# Patient Record
Sex: FEMALE | Race: WHITE | Employment: OTHER | URBAN - METROPOLITAN AREA
[De-identification: names, ages, dates, MRNs, and addresses within clinical notes are randomized per-mention and may not be internally consistent; named-entity substitution may affect disease eponyms.]

---

## 2018-04-11 ENCOUNTER — HOSPITAL ENCOUNTER (OUTPATIENT)
Dept: CARDIAC CATH/INVASIVE PROCEDURES | Age: 63
Discharge: HOME | End: 2018-04-11
Payer: MEDICARE

## 2018-04-11 ENCOUNTER — ANESTHESIA (OUTPATIENT)
Dept: CARDIAC CATH/INVASIVE PROCEDURES | Age: 63
End: 2018-04-11

## 2018-04-11 ENCOUNTER — ANESTHESIA EVENT (OUTPATIENT)
Dept: CARDIAC CATH/INVASIVE PROCEDURES | Age: 63
End: 2018-04-11

## 2018-04-11 ENCOUNTER — APPOINTMENT (OUTPATIENT)
Dept: CARDIAC CATH/INVASIVE PROCEDURES | Age: 63
End: 2018-04-11
Payer: MEDICARE

## 2018-04-11 VITALS
BODY MASS INDEX: 38.93 KG/M2 | DIASTOLIC BLOOD PRESSURE: 84 MMHG | SYSTOLIC BLOOD PRESSURE: 167 MMHG | HEART RATE: 120 BPM | HEIGHT: 64 IN | OXYGEN SATURATION: 100 % | TEMPERATURE: 98 F | WEIGHT: 228 LBS

## 2018-04-11 VITALS — OXYGEN SATURATION: 99 % | DIASTOLIC BLOOD PRESSURE: 72 MMHG | SYSTOLIC BLOOD PRESSURE: 165 MMHG

## 2018-04-11 DIAGNOSIS — I48.92 ATRIAL FLUTTER, UNSPECIFIED TYPE (HCC): ICD-10-CM

## 2018-04-11 LAB
ANION GAP SERPL CALCULATED.3IONS-SCNC: 14 MMOL/L (ref 7–16)
BASOPHILS ABSOLUTE: 0.02 E9/L (ref 0–0.2)
BASOPHILS RELATIVE PERCENT: 0.5 % (ref 0–2)
BUN BLDV-MCNC: 46 MG/DL (ref 8–23)
CALCIUM SERPL-MCNC: 10.1 MG/DL (ref 8.6–10.2)
CHLORIDE BLD-SCNC: 100 MMOL/L (ref 98–107)
CO2: 29 MMOL/L (ref 22–29)
CREAT SERPL-MCNC: 3.1 MG/DL (ref 0.5–1)
EKG ATRIAL RATE: 68 BPM
EKG P AXIS: 60 DEGREES
EKG P-R INTERVAL: 384 MS
EKG Q-T INTERVAL: 486 MS
EKG QRS DURATION: 160 MS
EKG QTC CALCULATION (BAZETT): 516 MS
EKG R AXIS: 24 DEGREES
EKG T AXIS: 14 DEGREES
EKG VENTRICULAR RATE: 68 BPM
EOSINOPHILS ABSOLUTE: 0.07 E9/L (ref 0.05–0.5)
EOSINOPHILS RELATIVE PERCENT: 1.8 % (ref 0–6)
GFR AFRICAN AMERICAN: 18
GFR NON-AFRICAN AMERICAN: 15 ML/MIN/1.73
GLUCOSE BLD-MCNC: 85 MG/DL (ref 74–109)
HCT VFR BLD CALC: 32.8 % (ref 34–48)
HEMOGLOBIN: 10.5 G/DL (ref 11.5–15.5)
IMMATURE GRANULOCYTES #: 0.01 E9/L
IMMATURE GRANULOCYTES %: 0.3 % (ref 0–5)
LYMPHOCYTES ABSOLUTE: 1.09 E9/L (ref 1.5–4)
LYMPHOCYTES RELATIVE PERCENT: 27.9 % (ref 20–42)
MAGNESIUM: 2 MG/DL (ref 1.6–2.6)
MCH RBC QN AUTO: 29.2 PG (ref 26–35)
MCHC RBC AUTO-ENTMCNC: 32 % (ref 32–34.5)
MCV RBC AUTO: 91.1 FL (ref 80–99.9)
MONOCYTES ABSOLUTE: 0.29 E9/L (ref 0.1–0.95)
MONOCYTES RELATIVE PERCENT: 7.4 % (ref 2–12)
NEUTROPHILS ABSOLUTE: 2.42 E9/L (ref 1.8–7.3)
NEUTROPHILS RELATIVE PERCENT: 62.1 % (ref 43–80)
PDW BLD-RTO: 13.7 FL (ref 11.5–15)
PLATELET # BLD: 125 E9/L (ref 130–450)
PMV BLD AUTO: 12 FL (ref 7–12)
POTASSIUM SERPL-SCNC: 3.5 MMOL/L (ref 3.5–5)
RBC # BLD: 3.6 E12/L (ref 3.5–5.5)
SODIUM BLD-SCNC: 143 MMOL/L (ref 132–146)
WBC # BLD: 3.9 E9/L (ref 4.5–11.5)

## 2018-04-11 PROCEDURE — 93005 ELECTROCARDIOGRAM TRACING: CPT | Performed by: INTERNAL MEDICINE

## 2018-04-11 PROCEDURE — 36415 COLL VENOUS BLD VENIPUNCTURE: CPT

## 2018-04-11 PROCEDURE — 83735 ASSAY OF MAGNESIUM: CPT

## 2018-04-11 PROCEDURE — 2580000003 HC RX 258: Performed by: INTERNAL MEDICINE

## 2018-04-11 PROCEDURE — 93010 ELECTROCARDIOGRAM REPORT: CPT | Performed by: INTERNAL MEDICINE

## 2018-04-11 PROCEDURE — 80048 BASIC METABOLIC PNL TOTAL CA: CPT

## 2018-04-11 PROCEDURE — 6360000002 HC RX W HCPCS: Performed by: NURSE ANESTHETIST, CERTIFIED REGISTERED

## 2018-04-11 PROCEDURE — 85025 COMPLETE CBC W/AUTO DIFF WBC: CPT

## 2018-04-11 PROCEDURE — 3700000001 HC ADD 15 MINUTES (ANESTHESIA)

## 2018-04-11 PROCEDURE — 3700000000 HC ANESTHESIA ATTENDED CARE

## 2018-04-11 RX ORDER — SODIUM CHLORIDE 9 MG/ML
20 INJECTION, SOLUTION INTRAVENOUS ONCE
Status: COMPLETED | OUTPATIENT
Start: 2018-04-11 | End: 2018-04-11

## 2018-04-11 RX ORDER — PROPOFOL 10 MG/ML
INJECTION, EMULSION INTRAVENOUS PRN
Status: DISCONTINUED | OUTPATIENT
Start: 2018-04-11 | End: 2018-04-11 | Stop reason: SDUPTHER

## 2018-04-11 RX ADMIN — PROPOFOL 30 MG: 10 INJECTION, EMULSION INTRAVENOUS at 15:21

## 2018-04-11 RX ADMIN — SODIUM CHLORIDE: 9 INJECTION, SOLUTION INTRAVENOUS at 15:00

## 2018-04-11 RX ADMIN — PROPOFOL 50 MG: 10 INJECTION, EMULSION INTRAVENOUS at 15:20

## 2018-10-18 ENCOUNTER — HOSPITAL ENCOUNTER (OUTPATIENT)
Dept: OTHER | Age: 63
Discharge: HOME | End: 2018-10-18
Payer: MEDICARE

## 2018-10-18 ENCOUNTER — HOSPITAL ENCOUNTER (OUTPATIENT)
Age: 63
Discharge: HOME | End: 2018-10-18
Payer: MEDICARE

## 2018-10-18 LAB
ALBUMIN SERPL-MCNC: 4.2 G/DL (ref 3.5–5.2)
ALP BLD-CCNC: 97 U/L (ref 35–104)
ALT SERPL-CCNC: 16 U/L (ref 0–32)
ANION GAP SERPL CALCULATED.3IONS-SCNC: 12 MMOL/L (ref 7–16)
AST SERPL-CCNC: 19 U/L (ref 0–31)
BACTERIA: ABNORMAL /HPF
BASOPHILS ABSOLUTE: 0.05 E9/L (ref 0–0.2)
BASOPHILS RELATIVE PERCENT: 0.9 % (ref 0–2)
BILIRUB SERPL-MCNC: 0.8 MG/DL (ref 0–1.2)
BILIRUBIN URINE: NEGATIVE
BLOOD, URINE: ABNORMAL
BUN BLDV-MCNC: 37 MG/DL (ref 8–23)
C-REACTIVE PROTEIN, HIGH SENSITIVITY: 10.7 MG/L (ref 0–3)
CALCIUM IONIZED: 1.48 MMOL/L (ref 1.15–1.33)
CALCIUM SERPL-MCNC: 11 MG/DL (ref 8.6–10.2)
CHLORIDE BLD-SCNC: 101 MMOL/L (ref 98–107)
CHOLESTEROL, TOTAL: 149 MG/DL (ref 0–199)
CLARITY: ABNORMAL
CO2: 27 MMOL/L (ref 22–29)
COLOR: YELLOW
CREAT SERPL-MCNC: 3.3 MG/DL (ref 0.5–1)
CREATININE URINE: 99 MG/DL (ref 29–226)
EOSINOPHILS ABSOLUTE: 0.11 E9/L (ref 0.05–0.5)
EOSINOPHILS RELATIVE PERCENT: 2 % (ref 0–6)
FERRITIN: 75 NG/ML
GFR AFRICAN AMERICAN: 17
GFR NON-AFRICAN AMERICAN: 14 ML/MIN/1.73
GLUCOSE BLD-MCNC: 95 MG/DL (ref 74–109)
GLUCOSE URINE: NEGATIVE MG/DL
HBA1C MFR BLD: 5.3 % (ref 4–5.6)
HCT VFR BLD CALC: 34.8 % (ref 34–48)
HDLC SERPL-MCNC: 44 MG/DL
HEMOGLOBIN: 10.6 G/DL (ref 11.5–15.5)
IMMATURE GRANULOCYTES #: 0.02 E9/L
IMMATURE GRANULOCYTES %: 0.4 % (ref 0–5)
IRON SATURATION: 23 % (ref 15–50)
IRON: 59 MCG/DL (ref 37–145)
KETONES, URINE: NEGATIVE MG/DL
LDL CHOLESTEROL CALCULATED: 80 MG/DL (ref 0–99)
LEUKOCYTE ESTERASE, URINE: ABNORMAL
LYMPHOCYTES ABSOLUTE: 1.22 E9/L (ref 1.5–4)
LYMPHOCYTES RELATIVE PERCENT: 22.2 % (ref 20–42)
MAGNESIUM: 2.3 MG/DL (ref 1.6–2.6)
MCH RBC QN AUTO: 27.5 PG (ref 26–35)
MCHC RBC AUTO-ENTMCNC: 30.5 % (ref 32–34.5)
MCV RBC AUTO: 90.4 FL (ref 80–99.9)
MICROALBUMIN UR-MCNC: 390.1 MG/L
MICROALBUMIN/CREAT UR-RTO: 394 (ref 0–30)
MONOCYTES ABSOLUTE: 0.38 E9/L (ref 0.1–0.95)
MONOCYTES RELATIVE PERCENT: 6.9 % (ref 2–12)
NEUTROPHILS ABSOLUTE: 3.72 E9/L (ref 1.8–7.3)
NEUTROPHILS RELATIVE PERCENT: 67.6 % (ref 43–80)
NITRITE, URINE: NEGATIVE
PARATHYROID HORMONE INTACT: 24 PG/ML (ref 15–65)
PDW BLD-RTO: 14 FL (ref 11.5–15)
PH UA: 7 (ref 5–9)
PHOSPHORUS: 4.3 MG/DL (ref 2.5–4.5)
PLATELET # BLD: 168 E9/L (ref 130–450)
PMV BLD AUTO: 11.7 FL (ref 7–12)
POTASSIUM SERPL-SCNC: 4.3 MMOL/L (ref 3.5–5)
PROTEIN UA: 100 MG/DL
RBC # BLD: 3.85 E12/L (ref 3.5–5.5)
RBC UA: ABNORMAL /HPF (ref 0–2)
SODIUM BLD-SCNC: 140 MMOL/L (ref 132–146)
SPECIFIC GRAVITY UA: 1.01 (ref 1–1.03)
TOTAL IRON BINDING CAPACITY: 261 MCG/DL (ref 250–450)
TOTAL PROTEIN: 7.6 G/DL (ref 6.4–8.3)
TRIGL SERPL-MCNC: 126 MG/DL (ref 0–149)
URIC ACID, SERUM: 9.2 MG/DL (ref 2.4–5.7)
UROBILINOGEN, URINE: 0.2 E.U./DL
VITAMIN D 25-HYDROXY: 57 NG/ML (ref 30–100)
VLDLC SERPL CALC-MCNC: 25 MG/DL
WBC # BLD: 5.5 E9/L (ref 4.5–11.5)
WBC UA: >20 /HPF (ref 0–5)

## 2018-10-18 PROCEDURE — 83550 IRON BINDING TEST: CPT

## 2018-10-18 PROCEDURE — 82570 ASSAY OF URINE CREATININE: CPT

## 2018-10-18 PROCEDURE — 83036 HEMOGLOBIN GLYCOSYLATED A1C: CPT

## 2018-10-18 PROCEDURE — 84550 ASSAY OF BLOOD/URIC ACID: CPT

## 2018-10-18 PROCEDURE — 82044 UR ALBUMIN SEMIQUANTITATIVE: CPT

## 2018-10-18 PROCEDURE — 82330 ASSAY OF CALCIUM: CPT

## 2018-10-18 PROCEDURE — 36415 COLL VENOUS BLD VENIPUNCTURE: CPT

## 2018-10-18 PROCEDURE — 83540 ASSAY OF IRON: CPT

## 2018-10-18 PROCEDURE — 84100 ASSAY OF PHOSPHORUS: CPT

## 2018-10-18 PROCEDURE — 82728 ASSAY OF FERRITIN: CPT

## 2018-10-18 PROCEDURE — 86141 C-REACTIVE PROTEIN HS: CPT

## 2018-10-18 PROCEDURE — 99211 OFF/OP EST MAY X REQ PHY/QHP: CPT

## 2018-10-18 PROCEDURE — 83735 ASSAY OF MAGNESIUM: CPT

## 2018-10-18 PROCEDURE — 83970 ASSAY OF PARATHORMONE: CPT

## 2018-10-18 PROCEDURE — 80061 LIPID PANEL: CPT

## 2018-10-18 PROCEDURE — 82306 VITAMIN D 25 HYDROXY: CPT

## 2018-10-18 PROCEDURE — 81001 URINALYSIS AUTO W/SCOPE: CPT

## 2018-10-18 PROCEDURE — 85025 COMPLETE CBC W/AUTO DIFF WBC: CPT

## 2018-10-18 PROCEDURE — 80053 COMPREHEN METABOLIC PANEL: CPT

## 2018-10-23 ENCOUNTER — HOSPITAL ENCOUNTER (OUTPATIENT)
Age: 63
Discharge: HOME | End: 2018-10-23
Payer: COMMERCIAL

## 2018-10-23 LAB
ANION GAP SERPL CALCULATED.3IONS-SCNC: 14 MMOL/L (ref 7–16)
BUN BLDV-MCNC: 55 MG/DL (ref 8–23)
CALCIUM IONIZED: 1.43 MMOL/L (ref 1.15–1.33)
CALCIUM SERPL-MCNC: 10.7 MG/DL (ref 8.6–10.2)
CHLORIDE BLD-SCNC: 103 MMOL/L (ref 98–107)
CO2: 26 MMOL/L (ref 22–29)
CREAT SERPL-MCNC: 3.2 MG/DL (ref 0.5–1)
GFR AFRICAN AMERICAN: 18
GFR NON-AFRICAN AMERICAN: 15 ML/MIN/1.73
GLUCOSE BLD-MCNC: 103 MG/DL (ref 74–109)
POTASSIUM SERPL-SCNC: 4.4 MMOL/L (ref 3.5–5)
SODIUM BLD-SCNC: 143 MMOL/L (ref 132–146)

## 2018-10-23 PROCEDURE — 36415 COLL VENOUS BLD VENIPUNCTURE: CPT

## 2018-10-23 PROCEDURE — 82330 ASSAY OF CALCIUM: CPT

## 2018-10-23 PROCEDURE — 80048 BASIC METABOLIC PNL TOTAL CA: CPT

## 2018-10-23 PROCEDURE — 87088 URINE BACTERIA CULTURE: CPT

## 2018-10-24 LAB — URINE CULTURE, ROUTINE: NORMAL

## 2018-11-06 ENCOUNTER — HOSPITAL ENCOUNTER (OUTPATIENT)
Age: 63
Discharge: HOME | End: 2018-11-06
Payer: COMMERCIAL

## 2018-11-06 LAB
ANION GAP SERPL CALCULATED.3IONS-SCNC: 12 MMOL/L (ref 7–16)
BUN BLDV-MCNC: 47 MG/DL (ref 8–23)
CALCIUM IONIZED: 1.29 MMOL/L (ref 1.15–1.33)
CALCIUM SERPL-MCNC: 9.7 MG/DL (ref 8.6–10.2)
CHLORIDE BLD-SCNC: 104 MMOL/L (ref 98–107)
CO2: 28 MMOL/L (ref 22–29)
CREAT SERPL-MCNC: 3.4 MG/DL (ref 0.5–1)
GFR AFRICAN AMERICAN: 17
GFR NON-AFRICAN AMERICAN: 14 ML/MIN/1.73
GLUCOSE BLD-MCNC: 75 MG/DL (ref 74–99)
PARATHYROID HORMONE INTACT: 86 PG/ML (ref 15–65)
POTASSIUM SERPL-SCNC: 5.2 MMOL/L (ref 3.5–5)
SODIUM BLD-SCNC: 144 MMOL/L (ref 132–146)
VITAMIN D 25-HYDROXY: 57 NG/ML (ref 30–100)

## 2018-11-06 PROCEDURE — 82306 VITAMIN D 25 HYDROXY: CPT

## 2018-11-06 PROCEDURE — 83970 ASSAY OF PARATHORMONE: CPT

## 2018-11-06 PROCEDURE — 36415 COLL VENOUS BLD VENIPUNCTURE: CPT

## 2018-11-06 PROCEDURE — 80048 BASIC METABOLIC PNL TOTAL CA: CPT

## 2018-11-06 PROCEDURE — 82330 ASSAY OF CALCIUM: CPT

## 2019-02-22 ENCOUNTER — HOSPITAL ENCOUNTER (OUTPATIENT)
Age: 64
Discharge: HOME | End: 2019-02-22
Payer: COMMERCIAL

## 2019-02-22 PROCEDURE — 87186 SC STD MICRODIL/AGAR DIL: CPT

## 2019-02-22 PROCEDURE — 87088 URINE BACTERIA CULTURE: CPT

## 2019-02-22 PROCEDURE — 87077 CULTURE AEROBIC IDENTIFY: CPT

## 2019-02-24 LAB
ORGANISM: ABNORMAL
URINE CULTURE, ROUTINE: ABNORMAL
URINE CULTURE, ROUTINE: ABNORMAL

## 2019-05-01 ENCOUNTER — HOSPITAL ENCOUNTER (OUTPATIENT)
Age: 64
Discharge: HOME | End: 2019-05-01
Payer: COMMERCIAL

## 2019-05-01 LAB
ANION GAP SERPL CALCULATED.3IONS-SCNC: 11 MMOL/L (ref 7–16)
BUN BLDV-MCNC: 42 MG/DL (ref 8–23)
CALCIUM SERPL-MCNC: 9.3 MG/DL (ref 8.6–10.2)
CHLORIDE BLD-SCNC: 109 MMOL/L (ref 98–107)
CO2: 25 MMOL/L (ref 22–29)
CREAT SERPL-MCNC: 2 MG/DL (ref 0.5–1)
GFR AFRICAN AMERICAN: 30
GFR NON-AFRICAN AMERICAN: 25 ML/MIN/1.73
GLUCOSE BLD-MCNC: 71 MG/DL (ref 74–99)
HCT VFR BLD CALC: 38.8 % (ref 34–48)
HEMOGLOBIN: 12.2 G/DL (ref 11.5–15.5)
MAGNESIUM: 2.1 MG/DL (ref 1.6–2.6)
MCH RBC QN AUTO: 28 PG (ref 26–35)
MCHC RBC AUTO-ENTMCNC: 31.4 % (ref 32–34.5)
MCV RBC AUTO: 89.2 FL (ref 80–99.9)
PDW BLD-RTO: 15.5 FL (ref 11.5–15)
PLATELET # BLD: 135 E9/L (ref 130–450)
PMV BLD AUTO: 11.9 FL (ref 7–12)
POTASSIUM SERPL-SCNC: 4.9 MMOL/L (ref 3.5–5)
RBC # BLD: 4.35 E12/L (ref 3.5–5.5)
SODIUM BLD-SCNC: 145 MMOL/L (ref 132–146)
WBC # BLD: 4.6 E9/L (ref 4.5–11.5)

## 2019-05-01 PROCEDURE — 83735 ASSAY OF MAGNESIUM: CPT

## 2019-05-01 PROCEDURE — 80048 BASIC METABOLIC PNL TOTAL CA: CPT

## 2019-05-01 PROCEDURE — 36415 COLL VENOUS BLD VENIPUNCTURE: CPT

## 2019-05-01 PROCEDURE — 85027 COMPLETE CBC AUTOMATED: CPT

## 2019-05-22 ENCOUNTER — OFFICE VISIT (OUTPATIENT)
Dept: VASCULAR SURGERY | Age: 64
End: 2019-05-22
Payer: MEDICARE

## 2019-05-22 VITALS — HEART RATE: 64 BPM | DIASTOLIC BLOOD PRESSURE: 74 MMHG | SYSTOLIC BLOOD PRESSURE: 118 MMHG

## 2019-05-22 DIAGNOSIS — N18.4 CKD (CHRONIC KIDNEY DISEASE) STAGE 4, GFR 15-29 ML/MIN (HCC): Primary | ICD-10-CM

## 2019-05-22 PROCEDURE — 99203 OFFICE O/P NEW LOW 30 MIN: CPT | Performed by: NURSE PRACTITIONER

## 2019-05-22 NOTE — PROGRESS NOTES
Vascular Surgery Outpatient Consultation      Chief Complaint   Patient presents with    Surgical Consult     Had vein mapping, needs AVF, not on dialysis yet, patient is right handed. Reason for Consult:  Hemodialysis access. Requesting Physician:  Dr. Jay Ortiz:                The patient is a 61 y.o. female who is referred for evaluation of hemodialysis access. The patient reports an approximate renal function of 24%. The patient states being right-hand dominant. She denies any history of pacemaker or long term catheter on the left side. Past Medical History:        Diagnosis Date    A-fib [facility]) 2014    pt states diagnosed incidently during tx for uri, Dr Esvin Shahid PCP treating, pt to see Dr Deyanira Hankins first vs 2014.  Anemia     resolved states pt    Anemia associated with chronic renal failure     Arthritis     Cataract     CHF (congestive heart failure) (HCC)     Chronic kidney disease, stage IV (severe) (HCC)     Diabetes mellitus ([facility] [address]     PRETTY GOOD PER PT    Hyperlipidemia     Hypertension     FINE PER PT    IBS (irritable bowel syndrome)     Obesity     Osteoarthritis     knees, right hand and left shoulder    Right cataract     Tendonitis of foot     left    Vitamin D deficiency     resolved vit supplement per pt     Past Surgical History:        Procedure Laterality Date    APPENDECTOMY      same time as cholecystectomy    CATARACT REMOVAL WITH IMPLANT Left 2014    CATARACT REMOVAL WITH IMPLANT  14     SECTION  1987    [facility]   [id number] [facility]    COLONOSCOPY  2012    diarrhea, irritable bowel syndrome, anal polyp biopsied, Dr. Eric [facility], [facility]    ENDOSCOPY, COLON, DIAGNOSTIC      colonoscopy    EYE SURGERY      cataract bilateral    [address]    same time as C section, ?  left ovary    TUBAL LIGATION      same time as C section     Current Medications:   Prior to Admission medications    Medication Sig Start Date End Date Taking? Authorizing Provider   apixaban (ELIQUIS) 5 MG TABS tablet Take 1 tablet by mouth 2 times daily Pt States ok to continue preop per Dr Randell Quintanilla. 4/11/18  Yes Mariposa Mcgregor MD   insulin glargine (LANTUS) 100 UNIT/ML injection vial Inject 40 Units into the skin nightly    Yes Historical Provider, MD   magnesium oxide (MAG-OX) 400 MG tablet Take 400 mg by mouth daily   Yes Historical Provider, MD   hydrALAZINE (APRESOLINE) 50 MG tablet Take 50 mg by mouth 3 times daily    Yes Historical Provider, MD   furosemide (LASIX) 40 MG tablet Take 40 mg by mouth daily    Yes Historical Provider, MD   isosorbide mononitrate (IMDUR) 60 MG CR tablet Take 120 mg by mouth daily   Yes Historical Provider, MD   sodium bicarbonate 650 MG tablet Take 1,300 mg by mouth 2 times daily   Yes Historical Provider, MD   vitamin B-12 (CYANOCOBALAMIN) 1000 MCG tablet Take 500 mcg by mouth 2 times daily. Yes Historical Provider, MD   loratadine (CLARITIN) 10 MG tablet TAKE ONE TABLET BY MOUTH EVERY DAY AS NEEDED  Patient taking differently: Take 10 mg by mouth daily  7/31/13  Yes Herbie Thompson MD   pravastatin (PRAVACHOL) 40 MG tablet Take 1 tablet by mouth daily. Patient taking differently: Take 80 mg by mouth daily  4/3/13  Yes Ranjana Beltran MD   INSULIN SYRINGE .5CC/29G (ACCUSURE INS SYR .5CC/29GX1/2\") 29G X 1/2\" 0.5 ML MISC by Does not apply route. 12/6/12  Yes Rachel Ha MD   Coenzyme Q-10 100 MG CAPS Take 1 capsule by mouth daily. 12/6/12  Yes Rachel Ha MD   Glucose Blood (BLOOD GLUCOSE TEST STRIPS) STRP 1 each by In Vitro route daily. OK to switch per insurance coverage. 12/6/12  Yes Rachel Ha MD   Lancets MISC 1 each by Does not apply route. OK to change per insurance coverage.  12/6/12  Yes Rachel Ha MD   Vitamin D (CHOLECALCIFEROL) 1000 UNITS CAPS capsule Take 1,000 Units by mouth 2 times daily Historical Provider, MD   calcitRIOL (ROCALTROL) 0.25 MCG capsule Take 0.5 mcg by mouth daily Monday Wednesday AND FRIDAY    Historical Provider, MD     Allergies:  Latex; Adhesive tape; Aldactone [spironolactone]; Pcn [penicillins];  Terazosin hcl; and Naproxen    Social History     Socioeconomic History    Marital status:      Spouse name: Not on file    Number of children: Not on file    Years of education: Not on file    Highest education level: Not on file   Occupational History    Not on file   Social Needs    Financial resource strain: Not on file    Food insecurity:     Worry: Not on file     Inability: Not on file    Transportation needs:     Medical: Not on file     Non-medical: Not on file   Tobacco Use    Smoking status: Never Smoker    Smokeless tobacco: Never Used   Substance and Sexual Activity    Alcohol use: No    Drug use: No    Sexual activity: Not on file   Lifestyle    Physical activity:     Days per week: Not on file     Minutes per session: Not on file    Stress: Not on file   Relationships    Social connections:     Talks on phone: Not on file     Gets together: Not on file     Attends Gnosticist service: Not on file     Active member of club or organization: Not on file     Attends meetings of clubs or organizations: Not on file     Relationship status: Not on file    Intimate partner violence:     Fear of current or ex partner: Not on file     Emotionally abused: Not on file     Physically abused: Not on file     Forced sexual activity: Not on file   Other Topics Concern    Not on file   Social History Narrative    Not on file        Family History   Problem Relation Age of Onset    Heart Failure Mother     Cancer Mother         lung    Thyroid Disease Mother     Heart Failure Father     Diabetes Paternal Aunt     Diabetes Paternal Uncle        REVIEW OF SYSTEMS (New symptoms):    Eyes:      Blurred vision:  No [x]/Yes []               Diplopia:   No [x]/Yes []               Vision loss:       No [x]/Yes []   Ears, nose, throat:             Hearing loss:    No [x]/Yes []      Vertigo:   No [x]/Yes []                       Swallowing problem:  No [x]/Yes []               Nose bleeds:   No [x]/Yes []      Voice hoarseness:  No [x]/Yes []  Respiratory:             Cough:   No [x]/Yes []      Pleuritic chest pain:  No [x]/Yes []                        Dyspnea:   No [x]/Yes []      Wheezing:   No [x]/Yes []  Cardiovascular:             Angina:   No [x]/Yes []      Palpitations:   No [x]/Yes []          Claudication:    No [x]/Yes []      Leg swelling:   No [x]/Yes []  Gastrointestinal:             Nausea or vomiting:  No [x]/Yes []               Abdominal pain:  No [x]/Yes []                     Intestinal bleeding: No [x]/Yes []  Musculoskeletal:             Leg pain:   No [x]/Yes []      Back pain:   No [x]/Yes []                    Weakness:   No [x]/Yes []  Neurologic:             Numbness:   No [x]/Yes []      Paralysis:   No [x]/Yes []                       Headaches:   No [x]/Yes []  Hematologic, lymphatic:   Anemia:   No [x]/Yes []              Bleeding or bruising:  No [x]/Yes []              Fevers or chills: No [x]/Yes []  Endocrine:             Temp intolerance:   No [x]/Yes []                       Polydipsia, polyuria:  No [x]/Yes []  Skin:              Rash:    No [x]/Yes []      Ulcers:   No [x]/Yes []              Abnorm pigment: No [x]/Yes []  :              Frequency/urgency:  No [x]/Yes []      Hematuria:    No [x]/Yes []                      Incontinence:    No [x]/Yes []    PHYSICAL EXAM:  Vitals:    05/22/19 1130   BP: 118/74   Pulse: 64     General Appearance: alert and oriented to person, place and time, well developed and well- nourished, in no acute distress  Skin: warm and dry, no rash or erythema  Head: normocephalic and atraumatic  Eyes: extraocular eye movements intact, conjunctivae normal  ENT: tympanic membrane, external ear and ear canal normal bilaterally, nose without deformity  Pulmonary/Chest: clear to auscultation bilaterally- no wheezes, rales or rhonchi, normal air movement, no respiratory distress  Cardiovascular: normal rate, irregular rhythm, no carotid bruits  Abdomen: soft, non-tender, non-distended, normal bowel sounds, no masses or organomegaly  Musculoskeletal: normal range of motion, no joint swelling, deformity or tenderness  Neurologic: no cranial nerve deficit, gait, coordination and speech normal  Extremities: no leg edema bilaterally    PULSE EXAM      Right      Left   Brachial  2   Radial  2   Ulnar  1   (3=normal, 2=diminished, 1=barely palpable, 4=widened)    Problem List Items Addressed This Visit     None      Visit Diagnoses     CKD (chronic kidney disease) stage 4, GFR 15-29 ml/min ([facility])    -  Primary        I reviewed the vein mapping with the patient and feel that the best option is creation left brachiocephalic AVF. The procedure including risks, benefits and alternative options were discussed. The patient understands and wishes to proceed. Pt seen and plan reviewed with Dr. Shira Freedman. Tristin Ochoa CNP    No follow-ups on file.

## 2019-05-31 ENCOUNTER — HOSPITAL ENCOUNTER (OUTPATIENT)
Age: 64
Discharge: HOME | End: 2019-05-31
Payer: MEDICARE

## 2019-05-31 LAB
ANION GAP SERPL CALCULATED.3IONS-SCNC: 14 MMOL/L (ref 7–16)
BUN BLDV-MCNC: 59 MG/DL (ref 8–23)
CALCIUM SERPL-MCNC: 9.9 MG/DL (ref 8.6–10.2)
CHLORIDE BLD-SCNC: 104 MMOL/L (ref 98–107)
CO2: 26 MMOL/L (ref 22–29)
CREAT SERPL-MCNC: 2.1 MG/DL (ref 0.5–1)
GFR AFRICAN AMERICAN: 29
GFR NON-AFRICAN AMERICAN: 24 ML/MIN/1.73
GLUCOSE BLD-MCNC: 70 MG/DL (ref 74–99)
HCT VFR BLD CALC: 41.8 % (ref 34–48)
HEMOGLOBIN: 13.2 G/DL (ref 11.5–15.5)
MAGNESIUM: 2.3 MG/DL (ref 1.6–2.6)
MCH RBC QN AUTO: 28.3 PG (ref 26–35)
MCHC RBC AUTO-ENTMCNC: 31.6 % (ref 32–34.5)
MCV RBC AUTO: 89.5 FL (ref 80–99.9)
PDW BLD-RTO: 15.1 FL (ref 11.5–15)
PLATELET # BLD: 157 E9/L (ref 130–450)
PMV BLD AUTO: 11.5 FL (ref 7–12)
POTASSIUM SERPL-SCNC: 4.7 MMOL/L (ref 3.5–5)
RBC # BLD: 4.67 E12/L (ref 3.5–5.5)
SODIUM BLD-SCNC: 144 MMOL/L (ref 132–146)
WBC # BLD: 4.8 E9/L (ref 4.5–11.5)

## 2019-05-31 PROCEDURE — 36415 COLL VENOUS BLD VENIPUNCTURE: CPT

## 2019-05-31 PROCEDURE — 83735 ASSAY OF MAGNESIUM: CPT

## 2019-05-31 PROCEDURE — 80048 BASIC METABOLIC PNL TOTAL CA: CPT

## 2019-05-31 PROCEDURE — 85027 COMPLETE CBC AUTOMATED: CPT

## 2019-06-07 NOTE — PROGRESS NOTES
[facility] PRE-ADMISSION TESTING GENERAL INSTRUCTIONS- [facility]-phone number:[phone]    GENERAL INSTRUCTIONS  [x] Antibacterial Soap shower Night before and/or AM of Surgery  [] Anjum wipe instruction sheet and wipes given. [x] Nothing by mouth after midnight, including gum, candy, mints, or water. [x] You may brush your teeth, gargle, but do NOT swallow water. []Hibiclens shower  the night before and the morning of surgery. Do not use             Hibiclens on your face or head. [x]No smoking, chewing tobacco, illegal drugs, or alcohol within 24 hours of your surgery. [x] Jewelry, valuables or body piercing's should not be brought to the hospital. All body and/or tongue piercing's must be removed prior to arriving to hospital.  ALL hair pins must be removed. [x] Do not wear makeup, lotions, powders, deodorant. Nail polish as directed by the nurse. [x] Arrange transportation with a responsible adult  to and from the hospital. If you do not have a responsible adult  to transport you, you will need to make arrangements with a medical transportation company (i.e. [employer]. A Uber/taxi/bus is not appropriate unless you are accompanied by a responsible adult ). Arrange for someone to be with you for the remainder of the day and for 24 hours after your procedure due to having had anesthesia. Who will be your  for transportation?_______FAMILY MEMBER___________   Who will be staying with you for 24 hrs after your procedure?_____MEMBER_____________  [x] Bring insurance card and photo ID.  [] Transfusion Bracelet: Please bring with you to hospital, day of surgery  [] Bring urine specimen day of surgery. Any small container is acceptable. [] Use inhalers the morning of surgery and bring with you to hospital.  [] Bring copy of living will or healthcare power of  papers to be placed in your electronic record.   [] CPAP/BI-PAP: Please bring your machine if you are to spend the night in the hospital.     PARKING INSTRUCTIONS:   [x] Arrival Time:___0530__________  · [x] Parking lot '\"I\"  is located on [facility] (the corner of [facility] and [facility]). To enter, press the button and the gate will lift. A free token will be provided to exit the lot. One car per patient is allowed to park in this lot. All other cars are to park on [address] [place] [address] either in the parking garage or the handicap lot. [] To reach the [facility] lobby from [address] [place] [address], upon entering the hospital, take elevator B to the 3rd floor. EDUCATION INSTRUCTIONS:      [] Knee or hip replacement booklet & exercise pamphlets given. [] [address] placed in chart. [] Pre-admission Testing educational folder given  [] Incentive Spirometry,coughing & deep breathing exercises reviewed. []Medication information sheet(s)   [x]Fluoroscopy-Xray used in surgery reviewed with patient. Educational pamphlet placed in chart. [x]Pain: Post-op pain is normal and to be expected. You will be asked to rate your pain from 0-10(a zero is not acceptable-education is needed). Your post-op pain goal is:3  [x] Ask your nurse for your pain medication. [] Joint camp offered. [] Joint replacement booklets given. [] Other:___________________________    MEDICATION INSTRUCTIONS:   [x]Bring a complete list of your medications, please write the last time you took the medicine, give this list to the nurse. [x] Take the following medications the morning of surgery with 1-2 ounces of water:   [x] Stop herbal supplements and vitamins 5 days before your surgery. [x] DO NOT take any diabetic medicine the morning of surgery. Follow instructions for insulin the day before surgery. [x] If you are diabetic and your blood sugar is low or you feel symptomatic, you may drink 1-2 ounces of apple juice or take a glucose tablet.   The morning of your procedure, you may call the pre-op area if you have concerns about your blood sugar [phone]. [] Use your inhalers the morning of surgery. Bring your emergency inhaler with you day of surgery. [x] Follow physician instructions regarding any blood thinners you may be taking. WHAT TO EXPECT:  [x] The day of surgery you will be greeted and checked in by the [employer].  In addition, you will be registered in the [facility] by a Patient Access Representative. Please bring your photo ID and insurance card. A nurse will greet you in accordance to the time you are needed in the pre-op area to prepare you for surgery. Please do not be discouraged if you are not greeted in the order you arrive as there are many variables that are involved in patient preparation. Your patience is greatly appreciated as you wait for your nurse. Please bring in items such as: books, magazines, newspapers, electronics, or any other items  to occupy your time in the waiting area. [x]  Delays may occur with surgery and staff will make a sincere effort to keep you informed of delays. If any delays occur with your procedure, we apologize ahead of time for your inconvenience as we recognize the value of your time.

## 2019-06-12 ENCOUNTER — ANESTHESIA EVENT (OUTPATIENT)
Dept: OPERATING ROOM | Age: 64
End: 2019-06-12
Payer: MEDICARE

## 2019-06-13 ENCOUNTER — ANESTHESIA (OUTPATIENT)
Dept: OPERATING ROOM | Age: 64
End: 2019-06-13
Payer: MEDICARE

## 2019-06-13 ENCOUNTER — HOSPITAL ENCOUNTER (OUTPATIENT)
Age: 64
Setting detail: OUTPATIENT SURGERY
Discharge: HOME | End: 2019-06-13
Attending: SURGERY | Admitting: SURGERY
Payer: MEDICARE

## 2019-06-13 VITALS
RESPIRATION RATE: 20 BRPM | HEIGHT: 64 IN | HEART RATE: 56 BPM | TEMPERATURE: 97.6 F | BODY MASS INDEX: 40.97 KG/M2 | SYSTOLIC BLOOD PRESSURE: 143 MMHG | OXYGEN SATURATION: 97 % | WEIGHT: 240 LBS | DIASTOLIC BLOOD PRESSURE: 64 MMHG

## 2019-06-13 VITALS — SYSTOLIC BLOOD PRESSURE: 115 MMHG | DIASTOLIC BLOOD PRESSURE: 58 MMHG | OXYGEN SATURATION: 98 %

## 2019-06-13 DIAGNOSIS — Z01.812 PRE-OPERATIVE LABORATORY EXAMINATION: Primary | ICD-10-CM

## 2019-06-13 DIAGNOSIS — G89.18 POST-OP PAIN: ICD-10-CM

## 2019-06-13 LAB
ABO/RH: NORMAL
ANION GAP SERPL CALCULATED.3IONS-SCNC: 10 MMOL/L (ref 7–16)
ANTIBODY SCREEN: NORMAL
BUN BLDV-MCNC: 50 MG/DL (ref 8–23)
CALCIUM SERPL-MCNC: 9 MG/DL (ref 8.6–10.2)
CHLORIDE BLD-SCNC: 105 MMOL/L (ref 98–107)
CO2: 26 MMOL/L (ref 22–29)
CREAT SERPL-MCNC: 2.3 MG/DL (ref 0.5–1)
GFR AFRICAN AMERICAN: 26
GFR NON-AFRICAN AMERICAN: 21 ML/MIN/1.73
GLUCOSE BLD-MCNC: 102 MG/DL (ref 74–99)
HCT VFR BLD CALC: 36.6 % (ref 34–48)
HEMOGLOBIN: 11.6 G/DL (ref 11.5–15.5)
INR BLD: 1
MCH RBC QN AUTO: 28.6 PG (ref 26–35)
MCHC RBC AUTO-ENTMCNC: 31.7 % (ref 32–34.5)
MCV RBC AUTO: 90.1 FL (ref 80–99.9)
METER GLUCOSE: 95 MG/DL (ref 74–99)
PDW BLD-RTO: 15.2 FL (ref 11.5–15)
PLATELET # BLD: 158 E9/L (ref 130–450)
PMV BLD AUTO: 12.3 FL (ref 7–12)
POTASSIUM REFLEX MAGNESIUM: 4.8 MMOL/L (ref 3.5–5)
PROTHROMBIN TIME: 11.7 SEC (ref 9.3–12.4)
RBC # BLD: 4.06 E12/L (ref 3.5–5.5)
SODIUM BLD-SCNC: 141 MMOL/L (ref 132–146)
WBC # BLD: 5.4 E9/L (ref 4.5–11.5)

## 2019-06-13 PROCEDURE — 2709999900 HC NON-CHARGEABLE SUPPLY: Performed by: SURGERY

## 2019-06-13 PROCEDURE — 85027 COMPLETE CBC AUTOMATED: CPT

## 2019-06-13 PROCEDURE — 86900 BLOOD TYPING SEROLOGIC ABO: CPT

## 2019-06-13 PROCEDURE — 3700000001 HC ADD 15 MINUTES (ANESTHESIA): Performed by: SURGERY

## 2019-06-13 PROCEDURE — 3600000002 HC SURGERY LEVEL 2 BASE: Performed by: SURGERY

## 2019-06-13 PROCEDURE — 2500000003 HC RX 250 WO HCPCS: Performed by: SURGERY

## 2019-06-13 PROCEDURE — 2580000003 HC RX 258: Performed by: SURGERY

## 2019-06-13 PROCEDURE — 86850 RBC ANTIBODY SCREEN: CPT

## 2019-06-13 PROCEDURE — 6360000002 HC RX W HCPCS

## 2019-06-13 PROCEDURE — 7100000010 HC PHASE II RECOVERY - FIRST 15 MIN: Performed by: SURGERY

## 2019-06-13 PROCEDURE — 3600000012 HC SURGERY LEVEL 2 ADDTL 15MIN: Performed by: SURGERY

## 2019-06-13 PROCEDURE — 36821 AV FUSION DIRECT ANY SITE: CPT | Performed by: SURGERY

## 2019-06-13 PROCEDURE — 86901 BLOOD TYPING SEROLOGIC RH(D): CPT

## 2019-06-13 PROCEDURE — 6360000002 HC RX W HCPCS: Performed by: SURGERY

## 2019-06-13 PROCEDURE — 85610 PROTHROMBIN TIME: CPT

## 2019-06-13 PROCEDURE — 2500000003 HC RX 250 WO HCPCS

## 2019-06-13 PROCEDURE — 80048 BASIC METABOLIC PNL TOTAL CA: CPT

## 2019-06-13 PROCEDURE — 36415 COLL VENOUS BLD VENIPUNCTURE: CPT

## 2019-06-13 PROCEDURE — 82962 GLUCOSE BLOOD TEST: CPT

## 2019-06-13 PROCEDURE — 7100000011 HC PHASE II RECOVERY - ADDTL 15 MIN: Performed by: SURGERY

## 2019-06-13 PROCEDURE — 3700000000 HC ANESTHESIA ATTENDED CARE: Performed by: SURGERY

## 2019-06-13 RX ORDER — PROTAMINE SULFATE 10 MG/ML
INJECTION, SOLUTION INTRAVENOUS PRN
Status: DISCONTINUED | OUTPATIENT
Start: 2019-06-13 | End: 2019-06-13 | Stop reason: SDUPTHER

## 2019-06-13 RX ORDER — SODIUM CHLORIDE 0.9 % (FLUSH) 0.9 %
10 SYRINGE (ML) INJECTION PRN
Status: DISCONTINUED | OUTPATIENT
Start: 2019-06-13 | End: 2019-06-13 | Stop reason: HOSPADM

## 2019-06-13 RX ORDER — MIDAZOLAM HYDROCHLORIDE 1 MG/ML
INJECTION INTRAMUSCULAR; INTRAVENOUS PRN
Status: DISCONTINUED | OUTPATIENT
Start: 2019-06-13 | End: 2019-06-13 | Stop reason: SDUPTHER

## 2019-06-13 RX ORDER — HEPARIN SODIUM 1000 [USP'U]/ML
INJECTION, SOLUTION INTRAVENOUS; SUBCUTANEOUS PRN
Status: DISCONTINUED | OUTPATIENT
Start: 2019-06-13 | End: 2019-06-13 | Stop reason: SDUPTHER

## 2019-06-13 RX ORDER — SODIUM CHLORIDE 9 MG/ML
INJECTION, SOLUTION INTRAVENOUS CONTINUOUS
Status: DISCONTINUED | OUTPATIENT
Start: 2019-06-13 | End: 2019-06-13 | Stop reason: HOSPADM

## 2019-06-13 RX ORDER — CLINDAMYCIN PHOSPHATE 900 MG/50ML
900 INJECTION INTRAVENOUS
Status: COMPLETED | OUTPATIENT
Start: 2019-06-13 | End: 2019-06-13

## 2019-06-13 RX ORDER — LABETALOL HYDROCHLORIDE 5 MG/ML
10 INJECTION, SOLUTION INTRAVENOUS EVERY 10 MIN PRN
Status: DISCONTINUED | OUTPATIENT
Start: 2019-06-13 | End: 2019-06-13 | Stop reason: HOSPADM

## 2019-06-13 RX ORDER — KETAMINE HYDROCHLORIDE 10 MG/ML
INJECTION, SOLUTION INTRAMUSCULAR; INTRAVENOUS PRN
Status: DISCONTINUED | OUTPATIENT
Start: 2019-06-13 | End: 2019-06-13 | Stop reason: SDUPTHER

## 2019-06-13 RX ORDER — SODIUM CHLORIDE 0.9 % (FLUSH) 0.9 %
10 SYRINGE (ML) INJECTION EVERY 12 HOURS SCHEDULED
Status: DISCONTINUED | OUTPATIENT
Start: 2019-06-13 | End: 2019-06-13 | Stop reason: HOSPADM

## 2019-06-13 RX ORDER — PROPOFOL 10 MG/ML
INJECTION, EMULSION INTRAVENOUS CONTINUOUS PRN
Status: DISCONTINUED | OUTPATIENT
Start: 2019-06-13 | End: 2019-06-13 | Stop reason: SDUPTHER

## 2019-06-13 RX ORDER — SODIUM CHLORIDE 450 MG/100ML
INJECTION, SOLUTION INTRAVENOUS CONTINUOUS
Status: DISCONTINUED | OUTPATIENT
Start: 2019-06-13 | End: 2019-06-13

## 2019-06-13 RX ORDER — MORPHINE SULFATE 2 MG/ML
1 INJECTION, SOLUTION INTRAMUSCULAR; INTRAVENOUS EVERY 5 MIN PRN
Status: DISCONTINUED | OUTPATIENT
Start: 2019-06-13 | End: 2019-06-13 | Stop reason: HOSPADM

## 2019-06-13 RX ORDER — GLYCOPYRROLATE 1 MG/5 ML
SYRINGE (ML) INTRAVENOUS PRN
Status: DISCONTINUED | OUTPATIENT
Start: 2019-06-13 | End: 2019-06-13 | Stop reason: SDUPTHER

## 2019-06-13 RX ORDER — LIDOCAINE HYDROCHLORIDE 20 MG/ML
INJECTION, SOLUTION INTRAVENOUS PRN
Status: DISCONTINUED | OUTPATIENT
Start: 2019-06-13 | End: 2019-06-13 | Stop reason: SDUPTHER

## 2019-06-13 RX ORDER — HYDROCODONE BITARTRATE AND ACETAMINOPHEN 7.5; 325 MG/1; MG/1
1 TABLET ORAL EVERY 6 HOURS PRN
Qty: 20 TABLET | Refills: 0 | Status: SHIPPED | OUTPATIENT
Start: 2019-06-13 | End: 2019-06-18

## 2019-06-13 RX ORDER — PROMETHAZINE HYDROCHLORIDE 25 MG/ML
12.5 INJECTION, SOLUTION INTRAMUSCULAR; INTRAVENOUS
Status: DISCONTINUED | OUTPATIENT
Start: 2019-06-13 | End: 2019-06-13 | Stop reason: HOSPADM

## 2019-06-13 RX ADMIN — MIDAZOLAM HYDROCHLORIDE 2 MG: 1 INJECTION, SOLUTION INTRAMUSCULAR; INTRAVENOUS at 07:28

## 2019-06-13 RX ADMIN — Medication 0.2 MG: at 07:39

## 2019-06-13 RX ADMIN — HEPARIN SODIUM 10000 UNITS: 1000 INJECTION INTRAVENOUS; SUBCUTANEOUS at 08:20

## 2019-06-13 RX ADMIN — KETAMINE HYDROCHLORIDE 30 MG: 10 INJECTION, SOLUTION INTRAMUSCULAR; INTRAVENOUS at 07:38

## 2019-06-13 RX ADMIN — PROTAMINE SULFATE 30 MG: 10 INJECTION, SOLUTION INTRAVENOUS at 08:58

## 2019-06-13 RX ADMIN — SODIUM CHLORIDE: 9 INJECTION, SOLUTION INTRAVENOUS at 05:55

## 2019-06-13 RX ADMIN — CLINDAMYCIN PHOSPHATE 900 MG: 900 INJECTION, SOLUTION INTRAVENOUS at 07:29

## 2019-06-13 RX ADMIN — PROPOFOL 100 MCG/KG/MIN: 10 INJECTION, EMULSION INTRAVENOUS at 07:35

## 2019-06-13 RX ADMIN — KETAMINE HYDROCHLORIDE 10 MG: 10 INJECTION, SOLUTION INTRAMUSCULAR; INTRAVENOUS at 07:51

## 2019-06-13 RX ADMIN — LIDOCAINE HYDROCHLORIDE 40 MG: 20 INJECTION, SOLUTION INTRAVENOUS at 07:44

## 2019-06-13 ASSESSMENT — PAIN - FUNCTIONAL ASSESSMENT: PAIN_FUNCTIONAL_ASSESSMENT: 0-10

## 2019-06-13 ASSESSMENT — PULMONARY FUNCTION TESTS
PIF_VALUE: 0
PIF_VALUE: 0

## 2019-06-13 NOTE — ANESTHESIA PRE PROCEDURE
Department of Anesthesiology  Preprocedure Note       Name:  Isaiah Cantu   Age:  61 y.o.  :  1955                                          MRN:  [id number]         Date:  2019      Surgeon: Aranza Lilly):  Kosta Gimenez MD    Procedure: AV FISTULA CREATION  LEFT ARM (Left )    Medications prior to admission:   Prior to Admission medications    Medication Sig Start Date End Date Taking? Authorizing Provider   insulin glargine (LANTUS) 100 UNIT/ML injection vial Inject 35 Units into the skin nightly    Yes Historical Provider, MD   magnesium oxide (MAG-OX) 400 MG tablet Take 400 mg by mouth daily   Yes Historical Provider, MD   hydrALAZINE (APRESOLINE) 50 MG tablet Take 50 mg by mouth 3 times daily    Yes Historical Provider, MD   furosemide (LASIX) 40 MG tablet Take 40 mg by mouth daily    Yes Historical Provider, MD   isosorbide mononitrate (IMDUR) 60 MG CR tablet Take 60 mg by mouth daily    Yes Historical Provider, MD   sodium bicarbonate 650 MG tablet Take 1,300 mg by mouth 2 times daily   Yes Historical Provider, MD   vitamin B-12 (CYANOCOBALAMIN) 1000 MCG tablet Take 500 mcg by mouth 2 times daily. Yes Historical Provider, MD   loratadine (CLARITIN) 10 MG tablet TAKE ONE TABLET BY MOUTH EVERY DAY AS NEEDED  Patient taking differently: Take 10 mg by mouth daily  13  Yes Kaia De MD   pravastatin (PRAVACHOL) 40 MG tablet Take 1 tablet by mouth daily. Patient taking differently: Take 80 mg by mouth daily  4/3/13  Yes Concha Jha MD   Coenzyme Q-10 100 MG CAPS Take 1 capsule by mouth daily. 12  Yes Ortencia Favre, MD   apixaban (ELIQUIS) 5 MG TABS tablet Take 1 tablet by mouth 2 times daily Pt States ok to continue preop per Dr Kezia Elena. 18   Dorothy Edmondson MD   INSULIN SYRINGE .5CC/29G (ACCUSURE INS SYR .5CC/29GX1/2\") 29G X 1/2\" 0.5 ML MISC by Does not apply route.  12   Ortencia Favre, MD   Glucose Blood (BLOOD GLUCOSE TEST STRIPS) STRP 1 each by In Vitro route  Diabetes mellitus ([facility] [address]     PRETTY GOOD PER PT    Hyperlipidemia     Hypertension     FINE PER PT    IBS (irritable bowel syndrome)     Obesity     Osteoarthritis     knees, right hand and left shoulder    Right cataract     Tendonitis of foot     left    Vitamin D deficiency     resolved vit supplement per pt       Past Surgical History:        Procedure Laterality Date    APPENDECTOMY      same time as cholecystectomy    CATARACT REMOVAL WITH IMPLANT Left 2014    CATARACT REMOVAL WITH IMPLANT  14     [facility]  [facility]   [address] [facility]    COLONOSCOPY  2012    diarrhea, irritable bowel syndrome, anal polyp biopsied, Dr. Mario Cuadra, [facility]    ENDOSCOPY, COLON, DIAGNOSTIC      colonoscopy    EYE SURGERY      cataract bilateral    [address]    same time as C section, ? left ovary    TUBAL LIGATION  1987    same time as C section       Social History:    Social History     Tobacco Use    Smoking status: Never Smoker    Smokeless tobacco: Never Used   Substance Use Topics    Alcohol use:  No                                Counseling given: Not Answered      Vital Signs (Current):   Vitals:    19 1355 19 0522   BP:  (!) 168/74   Pulse:  67   Resp:  18   Temp:  36.1 °C (96.9 °F)   TempSrc:  Temporal   SpO2:  96%   Weight: 230 lb (104.3 kg) 240 lb (108.9 kg)   Height: 5' 4\" (1.626 m) 5' 4\" (1.626 m)                                              BP Readings from Last 3 Encounters:   19 (!) 168/74   19 118/74   18 (!) 167/84       NPO Status: Time of last liquid consumption:                         Time of last solid consumption:                         Date of last liquid consumption: 19                        Date of last solid food consumption: 19    BMI:   Wt Readings from Last 3 Encounters:   19 240 lb (108.9 kg)   18 228 lb (103.4 kg)   06/15/16 284 lb (128.8 kg)     Body mass index is 41.2 kg/m². CBC:   Lab Results   Component Value Date    WBC 5.4 06/13/2019    RBC 4.06 06/13/2019    HGB 11.6 06/13/2019    HCT 36.6 06/13/2019    MCV 90.1 06/13/2019    RDW 15.2 06/13/2019     06/13/2019       CMP:   Lab Results   Component Value Date     06/13/2019    K 4.8 06/13/2019     06/13/2019    CO2 26 06/13/2019    BUN 50 06/13/2019    CREATININE 2.3 06/13/2019    GFRAA 26 06/13/2019    LABGLOM 21 06/13/2019    GLUCOSE 102 06/13/2019    GLUCOSE 223 04/16/2012    PROT 7.6 10/18/2018    CALCIUM 9.0 06/13/2019    BILITOT 0.8 10/18/2018    ALKPHOS 97 10/18/2018    AST 19 10/18/2018    ALT 16 10/18/2018       POC Tests: No results for input(s): POCGLU, POCNA, POCK, POCCL, POCBUN, POCHEMO, POCHCT in the last 72 hours. Coags:   Lab Results   Component Value Date    PROTIME 11.7 06/13/2019    INR 1.0 06/13/2019       HCG (If Applicable): No results found for: PREGTESTUR, PREGSERUM, HCG, HCGQUANT     ABGs: No results found for: PHART, PO2ART, PDR3FZD, HWZ7XXG, BEART, N8KWWBZN     Type & Screen (If Applicable):  No results found for: LABABO, [address]    Anesthesia Evaluation  Patient summary reviewed and Nursing notes reviewed no history of anesthetic complications:   Airway: Mallampati: II  TM distance: <3 FB   Neck ROM: full  Mouth opening: > = 3 FB Dental:          Pulmonary:Negative Pulmonary ROS and normal exam  breath sounds clear to auscultation                             Cardiovascular:    (+) hypertension:, dysrhythmias: atrial fibrillation, CHF:, hyperlipidemia        Rhythm: irregular  Rate: normal                    Neuro/Psych:   Negative Neuro/Psych ROS              GI/Hepatic/Renal:   (+) renal disease: ESRD,          ROS comment: IBS . Endo/Other:    (+) DiabetesType II DM, , blood dyscrasia: anemia, arthritis: OA., .                 Abdominal:   (+) obese,         Vascular: negative vascular ROS.                                      Anesthesia Plan      MAC     ASA 4     (#20 R hand)  Induction: intravenous. Anesthetic plan and risks discussed with patient. Use of blood products discussed with patient whom consented to blood products. Plan discussed with CRNA and attending.                 Rehana Nur RN   6/13/2019

## 2019-06-13 NOTE — OP NOTE
Shira Dunhamar  1955      DATE OF PROCEDURE: 6/13/2019     SURGEON: Caroline Johnston M.D.     ASSISTANT: None     PREOPERATIVE DIAGNOSIS: Chronic renal failure, Stage 4. POSTOPERATIVE DIAGNOSIS: Same    OPERATION: Creation of left radiocephalic arteriovenous fistula     ANESTHESIA: Local and LMAC     ESTIMATED BLOOD LOSS: Minimal     COMPLICATIONS: None    DESCRIPTION OF PROCEDURE: The patient was identified and the procedure was confirmed. The left arm was prepped and draped in the usual sterile fashion. Lidocaine 1% mixed with 0.25% Marcaine was used for local anesthesia. A longitudinal skin incision was made over the lateral forearm and carried down through the subcutaneous tissue. The cephalic vein identified and dissected free from the surrounding tissues. The vein appeared to be good quality for the fistula. It was marked for orientation and branches were divided between silk ties. It was ligated distally and divided. Medially, the radial artery was identified and freed from the surrounding tissues. It was surrounded proximally and distally with vessel loops for control. The patient was heparinized and the artery was clamped proximally and distally. A longitudinal arteriotomy measuring 5 mm was made and the vein was cut to the appropriate length and spatulated and anastomosed to the side of the artery using a running 6-0 Prolene suture. After completing the anastomosis, the clamps were released and a thrill was appreciated through the fistula. A radial pulse was appreciated distal in the wrist. Hemostasis was obtained and the incision was irrigated with saline solution. The incision was approximated with Vicryl suture and Dermabond was applied to the incisions in the operating room. Needle, sponge and instrument counts were reported as correct x2. The patient tolerated the procedure and was transferred to the recovery area in satisfactory condition.      Caroilne Johnston    CC : Karen Frazier MD

## 2019-06-13 NOTE — ANESTHESIA POSTPROCEDURE EVALUATION
Department of Anesthesiology  Postprocedure Note    Patient: Aranza Ren  MRN: [id number]  YOB: 1955  Date of evaluation: 6/13/2019  Time:  6:25 PM     Procedure Summary     Date:  06/13/19 Room / Location:  [facility] OR 04 / [facility] OR    Anesthesia Start:  0728 Anesthesia Stop:  2457    Procedure:  AV FISTULA CREATION  LEFT ARM (Left ) Diagnosis:  (CHRONIC RENAL FAILURE)    Surgeon:  Jerald Michael MD Responsible Provider:  Tommy Escobedo MD    Anesthesia Type:  MAC ASA Status:  4          Anesthesia Type: MAC    Lupillo Phase I: Lupillo Score: 10    Lupillo Phase II:      Last vitals: Reviewed and per EMR flowsheets.        Anesthesia Post Evaluation    Patient location during evaluation: PACU  Patient participation: complete - patient participated  Level of consciousness: awake  Pain score: 0  Airway patency: patent  Nausea & Vomiting: no nausea and no vomiting  Complications: no  Cardiovascular status: hemodynamically stable  Respiratory status: acceptable  Hydration status: euvolemic

## 2019-06-13 NOTE — PROGRESS NOTES
Discharge instructions given to patient and family. Education on care of the graft. Verbalized understanding . Patient and son called a taxi for a ride home. They are disacharged to the waiting room to wait for a ride home. Vital signs stable and patient and family ok waiting for a ride in the waiting room.    Cookies and juice given

## 2019-06-13 NOTE — H&P
Current Medications:     Current Facility-Administered Medications:     sodium chloride flush 0.9 % injection 10 mL, 10 mL, Intravenous, 2 times per day, Bertha Kaur MD    sodium chloride flush 0.9 % injection 10 mL, 10 mL, Intravenous, PRN, Bertha Kaur MD    clindamycin (CLEOCIN) 900 mg in dextrose 5 % 50 mL IVPB, 900 mg, Intravenous, On Call to OR, Bertha Kaur MD    0.9 % sodium chloride infusion, , Intravenous, Continuous, Bertha Kaur MD, Last Rate: 50 mL/hr at 06/13/19 0555    promethazine (PHENERGAN) injection 12.5 mg, 12.5 mg, Intravenous, Once PRN, Terry Garcia MD    labetalol (NORMODYNE;TRANDATE) injection 10 mg, 10 mg, Intravenous, Q10 Min PRN, Terry Garcia MD    morphine (PF) injection 1 mg, 1 mg, Intravenous, Q5 Min PRN, Terry Garcia MD    HYDROmorphone (DILAUDID) injection 0.5 mg, 0.5 mg, Intravenous, Q5 Min PRN, Terry Garcia MD    Allergies:  Latex; Adhesive tape; Aldactone [spironolactone]; Pcn [penicillins];  Terazosin hcl; and Naproxen    Social History     Socioeconomic History    Marital status:      Spouse name: Not on file    Number of children: Not on file    Years of education: Not on file    Highest education level: Not on file   Occupational History    Not on file   Social Needs    Financial resource strain: Not on file    Food insecurity:     Worry: Not on file     Inability: Not on file    Transportation needs:     Medical: Not on file     Non-medical: Not on file   Tobacco Use    Smoking status: Never Smoker    Smokeless tobacco: Never Used   Substance and Sexual Activity    Alcohol use: No    Drug use: No    Sexual activity: Not on file   Lifestyle    Physical activity:     Days per week: Not on file     Minutes per session: Not on file    Stress: Not on file   Relationships    Social connections:     Talks on phone: Not on file     Gets together: Not on file     Attends Scientologist service: Not on file     Active member of club or organization: Not on file     Attends meetings of clubs or organizations: Not on file     Relationship status: Not on file    Intimate partner violence:     Fear of current or ex partner: Not on file     Emotionally abused: Not on file     Physically abused: Not on file     Forced sexual activity: Not on file   Other Topics Concern    Not on file   Social History Narrative    Not on file        Family History   Problem Relation Age of Onset    Heart Failure Mother     Cancer Mother         lung    Thyroid Disease Mother     Heart Failure Father     Diabetes Paternal Aunt     Diabetes Paternal Uncle        REVIEW OF SYSTEMS:    Gen: Negative for nausea, vomiting, diarrhea, fever, chills, night sweats, no weight loss or weight gain  HEENT: Negative for double vision, blurred vision, sore throat   Heart: Negative for HTN, palpitations, chest pain, or pain radiating to the arm, jaw or teeth  Lungs: Negative for wheezes, shortness of breath while at rest or lying down  GI: Negative for nausea, vomiting, diarrhea, or constipation  : Negative for dysuria, hematuria, increased frequency or urgency  Endo: Negative for polydipsia, polyuria, or heat or cold intolerances. Heme: Negative leg swelling, blood or bleeding disorders  Psych: Negative for Depression or anxiety  Ortho: Negative for pain in the joints, arthritis or gout  Vascular: Negative for claudication, calf pain, ulcerations, or rest pain.  He also denies any nonhealing lower extremity wounds      PHYSICAL EXAM:    Vitals:    06/13/19 0522   BP: (!) 168/74   Pulse: 67   Resp: 18   Temp: 96.9 °F (36.1 °C)   SpO2: 96%     CONSTITUTIONAL: Alert and oriented and answers questions are probably no acute distress  NECK: Trachea is midline no jugular venous distention no central lines  LUNGS: Clear to auscultation bilaterally no wheezes rales or rhonchi  CARDIOVASCULAR: Currently regular rate and rhythm  ABDOMEN: Soft

## 2019-06-17 ENCOUNTER — HOSPITAL ENCOUNTER (EMERGENCY)
Age: 64
Discharge: HOME | End: 2019-06-17
Attending: EMERGENCY MEDICINE
Payer: MEDICARE

## 2019-06-17 VITALS
WEIGHT: 240 LBS | DIASTOLIC BLOOD PRESSURE: 71 MMHG | HEIGHT: 64 IN | HEART RATE: 65 BPM | TEMPERATURE: 97.7 F | SYSTOLIC BLOOD PRESSURE: 153 MMHG | BODY MASS INDEX: 40.97 KG/M2 | RESPIRATION RATE: 16 BRPM | OXYGEN SATURATION: 97 %

## 2019-06-17 DIAGNOSIS — T14.8XXA WOUND INFECTION: Primary | ICD-10-CM

## 2019-06-17 DIAGNOSIS — L08.9 WOUND INFECTION: Primary | ICD-10-CM

## 2019-06-17 LAB
ANION GAP SERPL CALCULATED.3IONS-SCNC: 8 MMOL/L (ref 7–16)
BASOPHILS ABSOLUTE: 0.04 E9/L (ref 0–0.2)
BASOPHILS RELATIVE PERCENT: 0.7 % (ref 0–2)
BUN BLDV-MCNC: 41 MG/DL (ref 8–23)
CALCIUM SERPL-MCNC: 9.5 MG/DL (ref 8.6–10.2)
CHLORIDE BLD-SCNC: 105 MMOL/L (ref 98–107)
CO2: 30 MMOL/L (ref 22–29)
CREAT SERPL-MCNC: 2.1 MG/DL (ref 0.5–1)
EOSINOPHILS ABSOLUTE: 0.12 E9/L (ref 0.05–0.5)
EOSINOPHILS RELATIVE PERCENT: 2 % (ref 0–6)
GFR AFRICAN AMERICAN: 29
GFR NON-AFRICAN AMERICAN: 24 ML/MIN/1.73
GLUCOSE BLD-MCNC: 173 MG/DL (ref 74–99)
HCT VFR BLD CALC: 39.5 % (ref 34–48)
HEMOGLOBIN: 12.1 G/DL (ref 11.5–15.5)
IMMATURE GRANULOCYTES #: 0.02 E9/L
IMMATURE GRANULOCYTES %: 0.3 % (ref 0–5)
LACTIC ACID: 1.5 MMOL/L (ref 0.5–2.2)
LYMPHOCYTES ABSOLUTE: 1.04 E9/L (ref 1.5–4)
LYMPHOCYTES RELATIVE PERCENT: 17.2 % (ref 20–42)
MCH RBC QN AUTO: 28.6 PG (ref 26–35)
MCHC RBC AUTO-ENTMCNC: 30.6 % (ref 32–34.5)
MCV RBC AUTO: 93.4 FL (ref 80–99.9)
MONOCYTES ABSOLUTE: 0.37 E9/L (ref 0.1–0.95)
MONOCYTES RELATIVE PERCENT: 6.1 % (ref 2–12)
NEUTROPHILS ABSOLUTE: 4.44 E9/L (ref 1.8–7.3)
NEUTROPHILS RELATIVE PERCENT: 73.7 % (ref 43–80)
PDW BLD-RTO: 14.9 FL (ref 11.5–15)
PLATELET # BLD: 142 E9/L (ref 130–450)
PMV BLD AUTO: 11.8 FL (ref 7–12)
POTASSIUM SERPL-SCNC: 4.9 MMOL/L (ref 3.5–5)
RBC # BLD: 4.23 E12/L (ref 3.5–5.5)
SODIUM BLD-SCNC: 143 MMOL/L (ref 132–146)
WBC # BLD: 6 E9/L (ref 4.5–11.5)

## 2019-06-17 PROCEDURE — 87040 BLOOD CULTURE FOR BACTERIA: CPT

## 2019-06-17 PROCEDURE — 36415 COLL VENOUS BLD VENIPUNCTURE: CPT

## 2019-06-17 PROCEDURE — 80048 BASIC METABOLIC PNL TOTAL CA: CPT

## 2019-06-17 PROCEDURE — 99283 EMERGENCY DEPT VISIT LOW MDM: CPT

## 2019-06-17 PROCEDURE — 6370000000 HC RX 637 (ALT 250 FOR IP): Performed by: EMERGENCY MEDICINE

## 2019-06-17 PROCEDURE — 85025 COMPLETE CBC W/AUTO DIFF WBC: CPT

## 2019-06-17 PROCEDURE — 83605 ASSAY OF LACTIC ACID: CPT

## 2019-06-17 RX ORDER — CEPHALEXIN 250 MG/1
250 CAPSULE ORAL ONCE
Status: COMPLETED | OUTPATIENT
Start: 2019-06-17 | End: 2019-06-17

## 2019-06-17 RX ORDER — CEPHALEXIN 250 MG/1
250 CAPSULE ORAL 2 TIMES DAILY
Qty: 13 CAPSULE | Refills: 0 | Status: SHIPPED | OUTPATIENT
Start: 2019-06-17 | End: 2019-06-24

## 2019-06-17 RX ADMIN — CEPHALEXIN 250 MG: 250 CAPSULE ORAL at 19:52

## 2019-06-17 ASSESSMENT — PAIN SCALES - WONG BAKER: WONGBAKER_NUMERICALRESPONSE: 8

## 2019-06-17 ASSESSMENT — PAIN DESCRIPTION - PAIN TYPE: TYPE: ACUTE PAIN

## 2019-06-17 ASSESSMENT — PAIN DESCRIPTION - DESCRIPTORS: DESCRIPTORS: ACHING

## 2019-06-17 ASSESSMENT — PAIN DESCRIPTION - PROGRESSION: CLINICAL_PROGRESSION: GRADUALLY WORSENING

## 2019-06-17 ASSESSMENT — PAIN DESCRIPTION - LOCATION: LOCATION: ARM

## 2019-06-17 ASSESSMENT — PAIN DESCRIPTION - FREQUENCY: FREQUENCY: CONTINUOUS

## 2019-06-17 ASSESSMENT — PAIN DESCRIPTION - ORIENTATION: ORIENTATION: LEFT

## 2019-06-17 NOTE — ED NOTES
Blood cultures obtained from right AC per policy. Set (one of two) drawn at this time.              Javier Thakkar LPN  84/88/54 [id number]

## 2019-06-17 NOTE — ED PROVIDER NOTES
HPI:  19, Time: 7:25 PM         Gita Trevino is a 61 y.o. female presenting to the ED for wound check. Patient underwent a left AV fistula creation on 2019 by Dr. Barney Sevilla. States that yesterday she noticed some redness and warmth to her forearm incision site. She also reports some increased pain. She denies fevers, chest pain, shortness of breath, abdominal pain, nausea, and vomiting. Review of Systems:   Pertinent positives and negatives are stated within HPI, all other systems reviewed and are negative.          --------------------------------------------- PAST HISTORY ---------------------------------------------  Past Medical History:  has a past medical history of A-fib ([facility] [address] Anemia, Anemia associated with chronic renal failure, Arthritis, Cataract, CHF (congestive heart failure) ([address] Chronic kidney disease, stage IV (severe) ([address] Diabetes mellitus ([address] Hyperlipidemia, Hypertension, IBS (irritable bowel syndrome), Obesity, Osteoarthritis, Right cataract, Tendonitis of foot, and Vitamin D deficiency. Past Surgical History:  has a past surgical history that includes Cholecystectomy ();  section (); Appendectomy (); Ovary removal (); Tubal ligation (); Colonoscopy (2012); Endoscopy, colon, diagnostic; Cataract removal with implant (Left, 2014); Cataract removal with implant (14); eye surgery; and Dialysis fistula creation (Left, 2019). Social History:  reports that she has never smoked. She has never used smokeless tobacco. She reports that she does not drink alcohol or use drugs. Family History: family history includes Cancer in her mother; Diabetes in her paternal aunt and paternal uncle; Heart Failure in her father and mother; Thyroid Disease in her mother. The patients home medications have been reviewed. Allergies: Latex; Adhesive tape; Aldactone [spironolactone]; Pcn [penicillins];  Terazosin hcl; and Naproxen    -------------------------------------------------- RESULTS -------------------------------------------------  All laboratory and radiology results have been personally reviewed by myself   LABS:  Results for orders placed or performed during the hospital encounter of 06/17/19   CBC Auto Differential   Result Value Ref Range    WBC 6.0 4.5 - 11.5 E9/L    RBC 4.23 3.50 - 5.50 E12/L    Hemoglobin 12.1 11.5 - 15.5 g/dL    Hematocrit 39.5 34.0 - 48.0 %    MCV 93.4 80.0 - 99.9 fL    MCH 28.6 26.0 - 35.0 pg    MCHC 30.6 (L) 32.0 - 34.5 %    RDW 14.9 11.5 - 15.0 fL    Platelets 390 421 - 307 E9/L    MPV 11.8 7.0 - 12.0 fL    Neutrophils % 73.7 43.0 - 80.0 %    Immature Granulocytes % 0.3 0.0 - 5.0 %    Lymphocytes % 17.2 (L) 20.0 - 42.0 %    Monocytes % 6.1 2.0 - 12.0 %    Eosinophils % 2.0 0.0 - 6.0 %    Basophils % 0.7 0.0 - 2.0 %    Neutrophils # 4.44 1.80 - 7.30 E9/L    Immature Granulocytes # 0.02 E9/L    Lymphocytes # 1.04 (L) 1.50 - 4.00 E9/L    Monocytes # 0.37 0.10 - 0.95 E9/L    Eosinophils # 0.12 0.05 - 0.50 E9/L    Basophils # 0.04 0.00 - 0.20 D2/G   Basic Metabolic Panel   Result Value Ref Range    Sodium 143 132 - 146 mmol/L    Potassium 4.9 3.5 - 5.0 mmol/L    Chloride 105 98 - 107 mmol/L    CO2 30 (H) 22 - 29 mmol/L    Anion Gap 8 7 - 16 mmol/L    Glucose 173 (H) 74 - 99 mg/dL    BUN 41 (H) 8 - 23 mg/dL    CREATININE 2.1 (H) 0.5 - 1.0 mg/dL    GFR Non-African American 24 >=60 mL/min/1.73    GFR African American 29     Calcium 9.5 8.6 - 10.2 mg/dL   Lactic Acid, Plasma   Result Value Ref Range    Lactic Acid 1.5 0.5 - 2.2 mmol/L       RADIOLOGY:  Interpreted by Radiologist.  No orders to display       ------------------------- NURSING NOTES AND VITALS REVIEWED ---------------------------   The nursing notes within the ED encounter and vital signs as below have been reviewed.    BP (!) 153/71   Pulse 65   Temp 97.7 °F (36.5 °C) (Tympanic)   Resp 16   Ht 5' 4\" (1.626 m)   Wt 240 lb (108.9 kg) SpO2 97%   Breastfeeding? No   BMI 41.20 kg/m²   Oxygen Saturation Interpretation: Normal      ---------------------------------------------------PHYSICAL EXAM--------------------------------------      Constitutional/General: Alert and oriented x3, well appearing, non toxic in NAD  Head: Normocephalic and atraumatic  Eyes: PERRL, EOMI  Mouth: Oropharynx clear, handling secretions, no trismus  Neck: Supple, full ROM,   Pulmonary: Lungs clear to auscultation bilaterally, no wheezes, rales, or rhonchi. Not in respiratory distress  Cardiovascular:  Regular rate and rhythm, no murmurs, gallops, or rubs. 2+ distal pulses  Abdomen: Soft, non tender, non distended,   Extremities: Moves all extremities x 4. Warm and well perfused. Left forearm-incision clean and intact with mild surrounding warmth and erythema, no active discharge, no streaking, no induration, good palpable thrill  Skin: warm and dry without rash  Neurologic: GCS 15,  Psych: Normal Affect      ------------------------------ ED COURSE/MEDICAL DECISION MAKING----------------------  Medications   cephALEXin (KEFLEX) capsule 250 mg (250 mg Oral Given 6/17/19 1952)         Medical Decision Making/ED COURSE:   Patient is a 60-year-old female presenting for wound check s/p left AV fistula creation on 6/13/19. Patient did appear to have mild cellulitis around incision site. No fluctuance or evidence of abscess. No systemic symptoms. No leukocytosis. Creatinine elevation is baseline. Spoke with vascular surgery who are comfortable with outpatient follow-up and oral antibiotics. Strict ED return precautions discussed with patient and family. ED Course as of Jun 18 0044 Mon Jun 17, 2019 1924 Spoke with Dr. Francisca Rocha, on call for Dr. Jerald Arguelles. Agrees with plan to discharge patient home with oral antibiotics and follow up in the office with maria esther Arguelles on Wednesday.     [JA]   1946 Spoke with ED pharmacist. Discussed renal dosing for keflex for wound infection. [JA]      ED Course User Index  [JA] Xavier Swain MD         Counseling: The emergency provider has spoken with the patient and family member and discussed todays results, in addition to providing specific details for the plan of care and counseling regarding the diagnosis and prognosis. Questions are answered at this time and they are agreeable with the plan.      --------------------------------- IMPRESSION AND DISPOSITION ---------------------------------    IMPRESSION  1. Wound infection        DISPOSITION  Disposition: Discharge to home  Patient condition is stable      NOTE: This report was transcribed using voice recognition software.  Every effort was made to ensure accuracy; however, inadvertent computerized transcription errors may be present       Xavier Swain MD  06/18/19 8907

## 2019-06-19 ENCOUNTER — OFFICE VISIT (OUTPATIENT)
Dept: VASCULAR SURGERY | Age: 64
End: 2019-06-19

## 2019-06-19 DIAGNOSIS — N18.4 STAGE 4 CHRONIC KIDNEY DISEASE (HCC): Primary | ICD-10-CM

## 2019-06-19 PROCEDURE — 99024 POSTOP FOLLOW-UP VISIT: CPT | Performed by: SURGERY

## 2019-06-22 LAB — BLOOD CULTURE, ROUTINE: NORMAL

## 2019-06-26 ENCOUNTER — OFFICE VISIT (OUTPATIENT)
Dept: VASCULAR SURGERY | Age: 64
End: 2019-06-26

## 2019-06-26 VITALS — HEART RATE: 68 BPM | SYSTOLIC BLOOD PRESSURE: 132 MMHG | DIASTOLIC BLOOD PRESSURE: 76 MMHG

## 2019-06-26 DIAGNOSIS — N18.4 STAGE 4 CHRONIC KIDNEY DISEASE (HCC): Primary | ICD-10-CM

## 2019-06-26 PROCEDURE — 99024 POSTOP FOLLOW-UP VISIT: CPT | Performed by: SURGERY

## 2019-06-26 NOTE — PROGRESS NOTES
Vascular Surgery Outpatient Progress Note      Chief Complaint   Patient presents with    Post-Op Check     s/p Creation of left radiocephalic arteriovenous fistula       HISTORY OF PRESENT ILLNESS:                The patient is a 61 y.o. female who returns for follow-up evaluation of a left arm arteriovenous access. She is status post left radiocephalic fistula. She developed some redness and swelling she was sent to the emergency room and she was given antibiotics since that time she states that is improved. She currently is doing much better. She denies any drainage. She states that the redness is improved. She does still have a little bit of tingling and some intermittent numbness over the incision site that is superficial she denies any motor or sensation loss. Past Medical History:        Diagnosis Date    A-fib [facility]) 2014    pt states diagnosed incidently during tx for uri, Dr Juan Castillo PCP treating, pt to see Dr Lázaro Fairchild first vs 2014.     Anemia     resolved states pt    Anemia associated with chronic renal failure     Arthritis     Cataract     CHF (congestive heart failure) (HCC)     Chronic kidney disease, stage IV (severe) (HCC)     Diabetes mellitus ([address]     PRETTY GOOD PER PT    Hyperlipidemia     Hypertension     FINE PER PT    IBS (irritable bowel syndrome)     Obesity     Osteoarthritis     knees, right hand and left shoulder    Right cataract     Tendonitis of foot     left    Vitamin D deficiency     resolved vit supplement per pt     Past Surgical History:        Procedure Laterality Date    APPENDECTOMY      same time as cholecystectomy    CATARACT REMOVAL WITH IMPLANT Left 2014    CATARACT REMOVAL WITH IMPLANT  14     SECTION  1987    [facility]   [address] [facility]    COLONOSCOPY  2012    diarrhea, irritable bowel syndrome, anal polyp biopsied, Dr. Danyel Randall, [address] CREATION Left 6/13/2019    AV FISTULA CREATION  LEFT ARM performed by Vanesa Buck MD at [address], COLON, DIAGNOSTIC      colonoscopy    EYE SURGERY      cataract bilateral    [address]    same time as C section, ? left ovary    TUBAL LIGATION  1987    same time as C section     Current Medications:   Prior to Admission medications    Medication Sig Start Date End Date Taking? Authorizing Provider   apixaban (ELIQUIS) 5 MG TABS tablet Take 1 tablet by mouth 2 times daily Pt States ok to continue preop per Dr Ninfa Skelton. 4/11/18  Yes Pool Zamora MD   insulin glargine (LANTUS) 100 UNIT/ML injection vial Inject 35 Units into the skin nightly    Yes Historical Provider, MD   magnesium oxide (MAG-OX) 400 MG tablet Take 400 mg by mouth daily   Yes Historical Provider, MD   hydrALAZINE (APRESOLINE) 50 MG tablet Take 50 mg by mouth 3 times daily    Yes Historical Provider, MD   furosemide (LASIX) 40 MG tablet Take 40 mg by mouth daily    Yes Historical Provider, MD   isosorbide mononitrate (IMDUR) 60 MG CR tablet Take 60 mg by mouth daily    Yes Historical Provider, MD   sodium bicarbonate 650 MG tablet Take 1,300 mg by mouth 2 times daily   Yes Historical Provider, MD   vitamin B-12 (CYANOCOBALAMIN) 1000 MCG tablet Take 500 mcg by mouth 2 times daily. Yes Historical Provider, MD   loratadine (CLARITIN) 10 MG tablet TAKE ONE TABLET BY MOUTH EVERY DAY AS NEEDED  Patient taking differently: Take 10 mg by mouth daily  7/31/13  Yes Stefani Guevara MD   pravastatin (PRAVACHOL) 40 MG tablet Take 1 tablet by mouth daily. Patient taking differently: Take 80 mg by mouth daily  4/3/13  Yes Jannette Harman MD   INSULIN SYRINGE .5CC/29G (ACCUSURE INS SYR .5CC/29GX1/2\") 29G X 1/2\" 0.5 ML MISC by Does not apply route. 12/6/12  Yes Nurys Miranda MD   Coenzyme Q-10 100 MG CAPS Take 1 capsule by mouth daily.  12/6/12  Yes Nurys Miranda MD   Glucose Blood (BLOOD GLUCOSE TEST STRIPS) STRP 1 each by activity change, appetite change, chills, diaphoresis, fatigue, fever and unexpected weight change. Cardiac/pulmonary denies any chest pain shortness of breath or discomfort          PHYSICAL EXAM:  Vitals:    06/26/19 0933   BP: 132/76   Pulse: 68     General Appearance: alert and oriented to person, place and time, well developed and well- nourished, in no acute distress  Skin: Vision is essentially healed. She does not have any redness. She has minimal tenderness. There is no erythema. Head: normocephalic and atraumatic  Eyes: extraocular eye movements intact, conjunctivae normal  ENT: external ear and ear canal normal bilaterally, nose without deformity  Pulmonary/Chest: clear to auscultation bilaterally- no wheezes, rales or rhonchi, normal air movement, no respiratory distress  Cardiovascular: normal rate, regular rhythm, normal S1 and S2, no murmurs, no carotid bruits  Extremities: Left arm demonstrates a nice thrill in the radiocephalic fistula. Motor and sensation are intact. The incision is essentially healed. Problem List Items Addressed This Visit     Stage 4 chronic kidney disease ([address] - Primary          #1 status post creation of left arm arteriovenous access we will see her back in the next couple of weeks she will call if there is any questions or concerns such as redness swelling pain or discomfort or drainage from the wound. All this is improving significantly. She is a nice thrill is present. She is off antibiotics and we will see her back in the next 4 weeks to see how the access matures. No follow-ups on file.

## 2019-07-24 ENCOUNTER — TELEPHONE (OUTPATIENT)
Dept: VASCULAR SURGERY | Age: 64
End: 2019-07-24

## 2019-07-24 ENCOUNTER — OFFICE VISIT (OUTPATIENT)
Dept: VASCULAR SURGERY | Age: 64
End: 2019-07-24

## 2019-07-24 DIAGNOSIS — N18.6 ENCOUNTER REGARDING VASCULAR ACCESS FOR DIALYSIS FOR ESRD (HCC): Primary | ICD-10-CM

## 2019-07-24 DIAGNOSIS — Z99.2 ENCOUNTER REGARDING VASCULAR ACCESS FOR DIALYSIS FOR ESRD (HCC): Primary | ICD-10-CM

## 2019-07-24 PROCEDURE — 99024 POSTOP FOLLOW-UP VISIT: CPT | Performed by: NURSE PRACTITIONER

## 2019-08-08 ENCOUNTER — ANESTHESIA EVENT (OUTPATIENT)
Dept: OPERATING ROOM | Age: 64
End: 2019-08-08
Payer: MEDICARE

## 2019-08-09 ENCOUNTER — ANESTHESIA (OUTPATIENT)
Dept: OPERATING ROOM | Age: 64
End: 2019-08-09
Payer: MEDICARE

## 2019-08-09 ENCOUNTER — HOSPITAL ENCOUNTER (OUTPATIENT)
Age: 64
Setting detail: OUTPATIENT SURGERY
Discharge: HOME | End: 2019-08-09
Attending: SURGERY | Admitting: SURGERY
Payer: MEDICARE

## 2019-08-09 VITALS — TEMPERATURE: 97.9 F | OXYGEN SATURATION: 96 % | SYSTOLIC BLOOD PRESSURE: 112 MMHG | DIASTOLIC BLOOD PRESSURE: 56 MMHG

## 2019-08-09 VITALS
DIASTOLIC BLOOD PRESSURE: 78 MMHG | WEIGHT: 245 LBS | TEMPERATURE: 97.4 F | OXYGEN SATURATION: 96 % | SYSTOLIC BLOOD PRESSURE: 172 MMHG | HEIGHT: 64 IN | HEART RATE: 60 BPM | RESPIRATION RATE: 14 BRPM | BODY MASS INDEX: 41.83 KG/M2

## 2019-08-09 DIAGNOSIS — N18.4 STAGE 4 CHRONIC KIDNEY DISEASE (HCC): ICD-10-CM

## 2019-08-09 DIAGNOSIS — Z01.812 PRE-OPERATIVE LABORATORY EXAMINATION: Primary | ICD-10-CM

## 2019-08-09 LAB
ABO/RH: NORMAL
ANION GAP SERPL CALCULATED.3IONS-SCNC: 8 MMOL/L (ref 7–16)
ANTIBODY SCREEN: NORMAL
BUN BLDV-MCNC: 42 MG/DL (ref 8–23)
CALCIUM SERPL-MCNC: 9.3 MG/DL (ref 8.6–10.2)
CHLORIDE BLD-SCNC: 106 MMOL/L (ref 98–107)
CO2: 31 MMOL/L (ref 22–29)
CREAT SERPL-MCNC: 2 MG/DL (ref 0.5–1)
GFR AFRICAN AMERICAN: 30
GFR NON-AFRICAN AMERICAN: 25 ML/MIN/1.73
GLUCOSE BLD-MCNC: 90 MG/DL (ref 74–99)
HCT VFR BLD CALC: 43 % (ref 34–48)
HEMOGLOBIN: 13.2 G/DL (ref 11.5–15.5)
INR BLD: 1.1
MCH RBC QN AUTO: 28.1 PG (ref 26–35)
MCHC RBC AUTO-ENTMCNC: 30.7 % (ref 32–34.5)
MCV RBC AUTO: 91.5 FL (ref 80–99.9)
METER GLUCOSE: 85 MG/DL (ref 74–99)
PDW BLD-RTO: 14.5 FL (ref 11.5–15)
PLATELET # BLD: 144 E9/L (ref 130–450)
PMV BLD AUTO: 12.2 FL (ref 7–12)
POTASSIUM REFLEX MAGNESIUM: 4.1 MMOL/L (ref 3.5–5)
PROTHROMBIN TIME: 12.6 SEC (ref 9.3–12.4)
RBC # BLD: 4.7 E12/L (ref 3.5–5.5)
SODIUM BLD-SCNC: 145 MMOL/L (ref 132–146)
WBC # BLD: 5.6 E9/L (ref 4.5–11.5)

## 2019-08-09 PROCEDURE — 6360000002 HC RX W HCPCS: Performed by: NURSE ANESTHETIST, CERTIFIED REGISTERED

## 2019-08-09 PROCEDURE — 2709999900 HC NON-CHARGEABLE SUPPLY: Performed by: SURGERY

## 2019-08-09 PROCEDURE — 85610 PROTHROMBIN TIME: CPT

## 2019-08-09 PROCEDURE — 86901 BLOOD TYPING SEROLOGIC RH(D): CPT

## 2019-08-09 PROCEDURE — 6360000002 HC RX W HCPCS: Performed by: SURGERY

## 2019-08-09 PROCEDURE — 36832 AV FISTULA REVISION OPEN: CPT | Performed by: SURGERY

## 2019-08-09 PROCEDURE — 86900 BLOOD TYPING SEROLOGIC ABO: CPT

## 2019-08-09 PROCEDURE — 6360000002 HC RX W HCPCS

## 2019-08-09 PROCEDURE — 85027 COMPLETE CBC AUTOMATED: CPT

## 2019-08-09 PROCEDURE — 80048 BASIC METABOLIC PNL TOTAL CA: CPT

## 2019-08-09 PROCEDURE — 2580000003 HC RX 258: Performed by: SURGERY

## 2019-08-09 PROCEDURE — 86850 RBC ANTIBODY SCREEN: CPT

## 2019-08-09 PROCEDURE — 3600000012 HC SURGERY LEVEL 2 ADDTL 15MIN: Performed by: SURGERY

## 2019-08-09 PROCEDURE — 36415 COLL VENOUS BLD VENIPUNCTURE: CPT

## 2019-08-09 PROCEDURE — 3600000002 HC SURGERY LEVEL 2 BASE: Performed by: SURGERY

## 2019-08-09 PROCEDURE — 2580000003 HC RX 258: Performed by: NURSE ANESTHETIST, CERTIFIED REGISTERED

## 2019-08-09 PROCEDURE — 2500000003 HC RX 250 WO HCPCS

## 2019-08-09 PROCEDURE — 2500000003 HC RX 250 WO HCPCS: Performed by: SURGERY

## 2019-08-09 PROCEDURE — 3700000000 HC ANESTHESIA ATTENDED CARE: Performed by: SURGERY

## 2019-08-09 PROCEDURE — 7100000011 HC PHASE II RECOVERY - ADDTL 15 MIN: Performed by: SURGERY

## 2019-08-09 PROCEDURE — 3700000001 HC ADD 15 MINUTES (ANESTHESIA): Performed by: SURGERY

## 2019-08-09 PROCEDURE — 7100000010 HC PHASE II RECOVERY - FIRST 15 MIN: Performed by: SURGERY

## 2019-08-09 PROCEDURE — 82962 GLUCOSE BLOOD TEST: CPT

## 2019-08-09 RX ORDER — HYDROCODONE BITARTRATE AND ACETAMINOPHEN 5; 325 MG/1; MG/1
1 TABLET ORAL EVERY 4 HOURS PRN
Qty: 18 TABLET | Refills: 0 | Status: SHIPPED | OUTPATIENT
Start: 2019-08-09 | End: 2019-08-12

## 2019-08-09 RX ORDER — MEPERIDINE HYDROCHLORIDE 50 MG/ML
12.5 INJECTION INTRAMUSCULAR; INTRAVENOUS; SUBCUTANEOUS EVERY 5 MIN PRN
Status: DISCONTINUED | OUTPATIENT
Start: 2019-08-09 | End: 2019-08-09 | Stop reason: HOSPADM

## 2019-08-09 RX ORDER — FENTANYL CITRATE 50 UG/ML
100 INJECTION, SOLUTION INTRAMUSCULAR; INTRAVENOUS ONCE
Status: DISCONTINUED | OUTPATIENT
Start: 2019-08-09 | End: 2019-08-09

## 2019-08-09 RX ORDER — HYDROCODONE BITARTRATE AND ACETAMINOPHEN 5; 325 MG/1; MG/1
2 TABLET ORAL PRN
Status: DISCONTINUED | OUTPATIENT
Start: 2019-08-09 | End: 2019-08-09 | Stop reason: HOSPADM

## 2019-08-09 RX ORDER — ROPIVACAINE HYDROCHLORIDE 5 MG/ML
30 INJECTION, SOLUTION EPIDURAL; INFILTRATION; PERINEURAL ONCE
Status: DISCONTINUED | OUTPATIENT
Start: 2019-08-09 | End: 2019-08-09

## 2019-08-09 RX ORDER — MIDAZOLAM HYDROCHLORIDE 1 MG/ML
INJECTION INTRAMUSCULAR; INTRAVENOUS PRN
Status: DISCONTINUED | OUTPATIENT
Start: 2019-08-09 | End: 2019-08-09 | Stop reason: SDUPTHER

## 2019-08-09 RX ORDER — PROPOFOL 10 MG/ML
INJECTION, EMULSION INTRAVENOUS CONTINUOUS PRN
Status: DISCONTINUED | OUTPATIENT
Start: 2019-08-09 | End: 2019-08-09 | Stop reason: SDUPTHER

## 2019-08-09 RX ORDER — SODIUM CHLORIDE 9 MG/ML
INJECTION, SOLUTION INTRAVENOUS CONTINUOUS PRN
Status: DISCONTINUED | OUTPATIENT
Start: 2019-08-09 | End: 2019-08-09 | Stop reason: SDUPTHER

## 2019-08-09 RX ORDER — PHENYLEPHRINE HYDROCHLORIDE 10 MG/ML
INJECTION INTRAVENOUS PRN
Status: DISCONTINUED | OUTPATIENT
Start: 2019-08-09 | End: 2019-08-09 | Stop reason: SDUPTHER

## 2019-08-09 RX ORDER — MIDAZOLAM HYDROCHLORIDE 1 MG/ML
2 INJECTION INTRAMUSCULAR; INTRAVENOUS ONCE
Status: DISCONTINUED | OUTPATIENT
Start: 2019-08-09 | End: 2019-08-09

## 2019-08-09 RX ORDER — PROMETHAZINE HYDROCHLORIDE 25 MG/ML
6.25 INJECTION, SOLUTION INTRAMUSCULAR; INTRAVENOUS EVERY 10 MIN PRN
Status: DISCONTINUED | OUTPATIENT
Start: 2019-08-09 | End: 2019-08-09 | Stop reason: HOSPADM

## 2019-08-09 RX ORDER — SODIUM CHLORIDE 0.9 % (FLUSH) 0.9 %
10 SYRINGE (ML) INJECTION EVERY 12 HOURS SCHEDULED
Status: DISCONTINUED | OUTPATIENT
Start: 2019-08-09 | End: 2019-08-09 | Stop reason: HOSPADM

## 2019-08-09 RX ORDER — MORPHINE SULFATE 2 MG/ML
2 INJECTION, SOLUTION INTRAMUSCULAR; INTRAVENOUS EVERY 5 MIN PRN
Status: DISCONTINUED | OUTPATIENT
Start: 2019-08-09 | End: 2019-08-09 | Stop reason: HOSPADM

## 2019-08-09 RX ORDER — CLINDAMYCIN PHOSPHATE 900 MG/50ML
900 INJECTION INTRAVENOUS
Status: COMPLETED | OUTPATIENT
Start: 2019-08-09 | End: 2019-08-09

## 2019-08-09 RX ORDER — GLYCOPYRROLATE 1 MG/5 ML
SYRINGE (ML) INTRAVENOUS PRN
Status: DISCONTINUED | OUTPATIENT
Start: 2019-08-09 | End: 2019-08-09 | Stop reason: SDUPTHER

## 2019-08-09 RX ORDER — FENTANYL CITRATE 50 UG/ML
INJECTION, SOLUTION INTRAMUSCULAR; INTRAVENOUS PRN
Status: DISCONTINUED | OUTPATIENT
Start: 2019-08-09 | End: 2019-08-09 | Stop reason: SDUPTHER

## 2019-08-09 RX ORDER — HYDROCODONE BITARTRATE AND ACETAMINOPHEN 5; 325 MG/1; MG/1
1 TABLET ORAL PRN
Status: DISCONTINUED | OUTPATIENT
Start: 2019-08-09 | End: 2019-08-09 | Stop reason: HOSPADM

## 2019-08-09 RX ORDER — MORPHINE SULFATE 2 MG/ML
1 INJECTION, SOLUTION INTRAMUSCULAR; INTRAVENOUS EVERY 5 MIN PRN
Status: DISCONTINUED | OUTPATIENT
Start: 2019-08-09 | End: 2019-08-09 | Stop reason: HOSPADM

## 2019-08-09 RX ORDER — LIDOCAINE HYDROCHLORIDE 20 MG/ML
INJECTION, SOLUTION INTRAVENOUS PRN
Status: DISCONTINUED | OUTPATIENT
Start: 2019-08-09 | End: 2019-08-09 | Stop reason: SDUPTHER

## 2019-08-09 RX ORDER — SODIUM CHLORIDE 0.9 % (FLUSH) 0.9 %
10 SYRINGE (ML) INJECTION PRN
Status: DISCONTINUED | OUTPATIENT
Start: 2019-08-09 | End: 2019-08-09 | Stop reason: HOSPADM

## 2019-08-09 RX ORDER — SODIUM CHLORIDE 450 MG/100ML
INJECTION, SOLUTION INTRAVENOUS CONTINUOUS
Status: DISCONTINUED | OUTPATIENT
Start: 2019-08-09 | End: 2019-08-09 | Stop reason: HOSPADM

## 2019-08-09 RX ADMIN — FENTANYL CITRATE 25 MCG: 50 INJECTION, SOLUTION INTRAMUSCULAR; INTRAVENOUS at 08:11

## 2019-08-09 RX ADMIN — FENTANYL CITRATE 25 MCG: 50 INJECTION, SOLUTION INTRAMUSCULAR; INTRAVENOUS at 07:50

## 2019-08-09 RX ADMIN — CLINDAMYCIN PHOSPHATE 900 MG: 18 INJECTION, SOLUTION INTRAVENOUS at 07:48

## 2019-08-09 RX ADMIN — PROPOFOL 100 MCG/KG/MIN: 10 INJECTION, EMULSION INTRAVENOUS at 07:41

## 2019-08-09 RX ADMIN — MIDAZOLAM HYDROCHLORIDE 2 MG: 1 INJECTION, SOLUTION INTRAMUSCULAR; INTRAVENOUS at 07:26

## 2019-08-09 RX ADMIN — SODIUM CHLORIDE: 9 INJECTION, SOLUTION INTRAVENOUS at 07:15

## 2019-08-09 RX ADMIN — PHENYLEPHRINE HYDROCHLORIDE 100 MCG: 10 INJECTION INTRAVENOUS at 08:50

## 2019-08-09 RX ADMIN — LIDOCAINE HYDROCHLORIDE 40 MG: 20 INJECTION, SOLUTION INTRAVENOUS at 07:41

## 2019-08-09 RX ADMIN — Medication 0.2 MG: at 07:58

## 2019-08-09 RX ADMIN — FENTANYL CITRATE 25 MCG: 50 INJECTION, SOLUTION INTRAMUSCULAR; INTRAVENOUS at 08:05

## 2019-08-09 ASSESSMENT — PULMONARY FUNCTION TESTS
PIF_VALUE: 0
PIF_VALUE: 1
PIF_VALUE: 0
PIF_VALUE: 1
PIF_VALUE: 0

## 2019-08-09 ASSESSMENT — PAIN - FUNCTIONAL ASSESSMENT: PAIN_FUNCTIONAL_ASSESSMENT: 0-10

## 2019-08-09 ASSESSMENT — PAIN SCALES - GENERAL
PAINLEVEL_OUTOF10: 0
PAINLEVEL_OUTOF10: 0

## 2019-08-09 NOTE — H&P
Vascular Surgery History & Physical Exam      Chief Complaint: CKD stage IV    HISTORY OF PRESENT ILLNESS:                The patient is a 61 y.o. female who presents to the hospital for elective revision of left UE fistula. Patient had a RC fistula in  that has a great palpable thrill, but becomes deep proximally and presents for revision of fistula. She denies any complaints. She denies any chest pain, shortness of breath, nausea, or vomiting. Past Medical History:   Diagnosis Date    A-fib [facility]) 2014    pt states diagnosed incidently during tx for uri, Dr Nkechi Padron PCP treating, pt to see Dr Ronal Kennedy first vs 2014.  Anemia     resolved states pt    Anemia associated with chronic renal failure     Arthritis     Cataract     CHF (congestive heart failure) (HCC)     Chronic kidney disease, stage IV (severe) (HCC)     Diabetes mellitus ([address]     PRETTY GOOD PER PT    Hyperlipidemia     Hypertension     FINE PER PT    IBS (irritable bowel syndrome)     Obesity     Osteoarthritis     knees, right hand and left shoulder    Right cataract     Tendonitis of foot     left    Vitamin D deficiency     resolved vit supplement per pt        Past Surgical History:   Procedure Laterality Date    APPENDECTOMY      same time as cholecystectomy    CATARACT REMOVAL WITH IMPLANT Left 2014    CATARACT REMOVAL WITH IMPLANT  14     SECTION  1987    [facility]   [address] [facility]    COLONOSCOPY  2012    diarrhea, irritable bowel syndrome, anal polyp biopsied, Dr. Krystal Nur, [address] Left 2019    AV FISTULA CREATION  LEFT ARM performed by Taryn Conti MD at [address] [place] [address] Rd, COLON, DIAGNOSTIC      colonoscopy    EYE SURGERY      cataract bilateral    [address]    same time as C section, ?  left ovary    TUBAL LIGATION      same time as C section       Current Medications: HCT 43.0 08/09/2019     08/09/2019    PROTIME 12.6 (H) 08/09/2019    INR 1.1 08/09/2019    K 4.9 06/17/2019    BUN 41 (H) 06/17/2019    CREATININE 2.1 (H) 06/17/2019       RADIOLOGY:    No results found. ASSESSMENT:  61 y.o. female  With CKD stage 4 s/p left RC fistula with need for superficialization    PLAN:   1. Will plan for left fistula revision with Dr. Camacho Lawson, all risks, benefits and alternatives discussed with patient and she is agreeable to proceed. Electronically signed by Justin Wong M.D, on 8/9/2019 at 6:52 AM        Risk benefits and alternatives were discussed the patient. Including but not limited to bleeding, infection, arterial venous nerve injury, myocardia infarction, loss of the access, wound complications, arterial steal venous hypertension distal embolization, limb loss and or death. She understands wishes to proceed. She will undergo a left arm fistula revision/super fistulization of the left arm brachiocephalic fistula.     Veronica Rutherford MD 8/9/2019

## 2019-08-09 NOTE — PROGRESS NOTES
Discharge instructions reviewed with pt and her son, questions answered, both acknowledge understanding.

## 2019-08-09 NOTE — ANESTHESIA PRE PROCEDURE
Department of Anesthesiology  Preprocedure Note       Name:  Omaira Stoner   Age:  61 y.o.  :  1955                                          MRN:  [id number]         Date:  2019      Surgeon: Milka Lamas):  Reji Caldwell MD    Procedure: AV FISTULA REVISION -- LEFT ARM (Left )    Medications prior to admission:   Prior to Admission medications    Medication Sig Start Date End Date Taking? Authorizing Provider   insulin glargine (LANTUS) 100 UNIT/ML injection vial Inject 35 Units into the skin nightly    Yes Historical Provider, MD   magnesium oxide (MAG-OX) 400 MG tablet Take 400 mg by mouth daily   Yes Historical Provider, MD   hydrALAZINE (APRESOLINE) 50 MG tablet Take 50 mg by mouth 3 times daily    Yes Historical Provider, MD   furosemide (LASIX) 40 MG tablet Take 40 mg by mouth daily    Yes Historical Provider, MD   isosorbide mononitrate (IMDUR) 60 MG CR tablet Take 60 mg by mouth daily    Yes Historical Provider, MD   sodium bicarbonate 650 MG tablet Take 1,300 mg by mouth 2 times daily   Yes Historical Provider, MD   vitamin B-12 (CYANOCOBALAMIN) 1000 MCG tablet Take 500 mcg by mouth 2 times daily. Yes Historical Provider, MD   loratadine (CLARITIN) 10 MG tablet TAKE ONE TABLET BY MOUTH EVERY DAY AS NEEDED  Patient taking differently: Take 10 mg by mouth daily  13  Yes Santa Lee MD   pravastatin (PRAVACHOL) 40 MG tablet Take 1 tablet by mouth daily. Patient taking differently: Take 80 mg by mouth daily  4/3/13  Yes Ed Ledezma MD   Coenzyme Q-10 100 MG CAPS Take 1 capsule by mouth daily. 12  Yes Tressia Klinefelter, MD   Glucose Blood (BLOOD GLUCOSE TEST STRIPS) STRP 1 each by In Vitro route daily. OK to switch per insurance coverage. 12  Yes Tressia Klinefelter, MD   apixaban (ELIQUIS) 5 MG TABS tablet Take 1 tablet by mouth 2 times daily Pt States ok to continue preop per Dr Semaj Aguilera.  18   Tammi Gonzalez MD   INSULIN SYRINGE .5CC/29G (ACCUSURE INS SYR .5CC/29GX1/2\")

## 2019-08-21 ENCOUNTER — OFFICE VISIT (OUTPATIENT)
Dept: VASCULAR SURGERY | Age: 64
End: 2019-08-21

## 2019-08-21 DIAGNOSIS — N18.4 STAGE 4 CHRONIC KIDNEY DISEASE (HCC): Primary | ICD-10-CM

## 2019-08-21 PROCEDURE — 99024 POSTOP FOLLOW-UP VISIT: CPT | Performed by: SURGERY

## 2019-08-21 NOTE — PROGRESS NOTES
Vascular Surgery Outpatient Progress Note      Chief Complaint   Patient presents with    Post-Op Check     s/p Revision of left radio cephalic arteriovenous fistula - superficialization       HISTORY OF PRESENT ILLNESS:                The patient is a 61 y.o. female who returns for follow-up evaluation of patient with a history of a left arm arteriovenous fistula elevation. Overall she is doing well she denies any arterial steal or venous hypertension symptoms. She denies any issues regarding the incision. She denies any chest pain or shortness of breath. Past Medical History:        Diagnosis Date    A-fib [facility]) 2014    pt states diagnosed incidently during tx for uri, Dr Kelle Vasquez PCP treating, pt to see Dr Viviane Leiva first vs 2014.     Anemia     resolved states pt    Anemia associated with chronic renal failure     Arthritis     Cataract     CHF (congestive heart failure) (HCC)     Chronic kidney disease, stage IV (severe) (HCC)     Diabetes mellitus ([facility] [address]     PRETTY GOOD PER PT    Hyperlipidemia     Hypertension     FINE PER PT    IBS (irritable bowel syndrome)     Obesity     Osteoarthritis     knees, right hand and left shoulder    Right cataract     Tendonitis of foot     left    Vitamin D deficiency     resolved vit supplement per pt     Past Surgical History:        Procedure Laterality Date    APPENDECTOMY      same time as cholecystectomy    CATARACT REMOVAL WITH IMPLANT Left 2014    CATARACT REMOVAL WITH IMPLANT  14     SECTION  [facility]    [facility]   [address] [facility]    COLONOSCOPY  2012    diarrhea, irritable bowel syndrome, anal polyp biopsied, Dr. Last Quesada, [facility]    DIALYSIS FISTULA CREATION Left 2019    AV FISTULA CREATION  LEFT ARM performed by Talib Siu MD at [address] Left 2019    AV FISTULA REVISION -- LEFT ARM performed by Talib Siu MD at [facility]    ENDOSCOPY, COLON, DIAGNOSTIC      colonoscopy    EYE SURGERY      cataract bilateral     OVARY REMOVAL  1987    same time as C section, ? left ovary    TUBAL LIGATION  1987    same time as C section     Current Medications:   Prior to Admission medications    Medication Sig Start Date End Date Taking? Authorizing Provider   apixaban (ELIQUIS) 5 MG TABS tablet Take 1 tablet by mouth 2 times daily Pt States ok to continue preop per Dr Percy Amato. 4/11/18  Yes Jose Modi MD   insulin glargine (LANTUS) 100 UNIT/ML injection vial Inject 35 Units into the skin nightly    Yes Historical Provider, MD   magnesium oxide (MAG-OX) 400 MG tablet Take 400 mg by mouth daily   Yes Historical Provider, MD   hydrALAZINE (APRESOLINE) 50 MG tablet Take 50 mg by mouth 3 times daily    Yes Historical Provider, MD   furosemide (LASIX) 40 MG tablet Take 40 mg by mouth daily    Yes Historical Provider, MD   isosorbide mononitrate (IMDUR) 60 MG CR tablet Take 60 mg by mouth daily    Yes Historical Provider, MD   sodium bicarbonate 650 MG tablet Take 1,300 mg by mouth 2 times daily   Yes Historical Provider, MD   vitamin B-12 (CYANOCOBALAMIN) 1000 MCG tablet Take 500 mcg by mouth 2 times daily. Yes Historical Provider, MD   loratadine (CLARITIN) 10 MG tablet TAKE ONE TABLET BY MOUTH EVERY DAY AS NEEDED  Patient taking differently: Take 10 mg by mouth daily  7/31/13  Yes Errol Carmona MD   pravastatin (PRAVACHOL) 40 MG tablet Take 1 tablet by mouth daily. Patient taking differently: Take 80 mg by mouth daily  4/3/13  Yes Concha Stratton MD   INSULIN SYRINGE .5CC/29G (ACCUSURE INS SYR .5CC/29GX1/2\") 29G X 1/2\" 0.5 ML MISC by Does not apply route. 12/6/12  Yes Jolly Christina MD   Coenzyme Q-10 100 MG CAPS Take 1 capsule by mouth daily. 12/6/12  Yes Jolly Christina MD   Glucose Blood (BLOOD GLUCOSE TEST STRIPS) STRP 1 each by In Vitro route daily. OK to switch per insurance coverage.  12/6/12  Yes Jolly Christina MD change. Respiratory: Negative for apnea, cough, chest tightness, shortness of breath and wheezing. Cardiovascular: Negative for chest pain, palpitations and leg swelling. Skin: Negative for color change, pallor, rash and wound. Allergic/Immunologic: Negative for environmental allergies, food allergies and immunocompromised state. PHYSICAL EXAM:  There were no vitals filed for this visit. General Appearance: alert and oriented to person, place and time, well developed and well- nourished, in no acute distress  Skin: warm and dry, no rash or erythema, the incision is healing nicely  Head: normocephalic and atraumatic  Eyes: extraocular eye movements intact, conjunctivae normal  ENT: external ear and ear canal normal bilaterally, nose without deformity  Pulmonary/Chest: clear to auscultation bilaterally- no wheezes, rales or rhonchi, normal air movement, no respiratory distress  Cardiovascular: normal rate, regular rhythm, normal S1 and S2, no murmurs, no carotid bruits  Neurologic: no cranial nerve deficit, gait, coordination and speech normal  Extremities: Left arm demonstrates a nice palpable thrill. Incision is healing nicely there is a palpable radial pulse. Motor and sensation are intact. Problem List Items Addressed This Visit     Stage 4 chronic kidney disease ([address] - Primary          #1 status post left arm fistula elevation. At this point she is doing well. There is a nice thrill of see her back in 4 weeks. She is currently not on dialysis. She will call if there is any questions or concerns. No follow-ups on file.

## 2019-09-13 ENCOUNTER — HOSPITAL ENCOUNTER (OUTPATIENT)
Age: 64
Discharge: HOME | End: 2019-09-13
Payer: MEDICARE

## 2019-09-13 ENCOUNTER — TELEPHONE (OUTPATIENT)
Dept: NON INVASIVE DIAGNOSTICS | Age: 64
End: 2019-09-13

## 2019-09-13 LAB
ALBUMIN SERPL-MCNC: 3.8 G/DL (ref 3.5–5.2)
ALP BLD-CCNC: 102 U/L (ref 35–104)
ALT SERPL-CCNC: 12 U/L (ref 0–32)
ANION GAP SERPL CALCULATED.3IONS-SCNC: 13 MMOL/L (ref 7–16)
AST SERPL-CCNC: 18 U/L (ref 0–31)
BACTERIA: ABNORMAL /HPF
BILIRUB SERPL-MCNC: 1.1 MG/DL (ref 0–1.2)
BILIRUBIN URINE: NEGATIVE
BLOOD, URINE: NEGATIVE
BUN BLDV-MCNC: 42 MG/DL (ref 8–23)
C-REACTIVE PROTEIN, HIGH SENSITIVITY: 8.5 MG/L (ref 0–3)
CALCIUM IONIZED: 1.26 MMOL/L (ref 1.15–1.33)
CALCIUM SERPL-MCNC: 9.3 MG/DL (ref 8.6–10.2)
CHLORIDE BLD-SCNC: 107 MMOL/L (ref 98–107)
CHOLESTEROL, FASTING: 163 MG/DL (ref 0–199)
CLARITY: CLEAR
CO2: 27 MMOL/L (ref 22–29)
COLOR: YELLOW
CREAT SERPL-MCNC: 2.3 MG/DL (ref 0.5–1)
CREATININE URINE: 101 MG/DL (ref 29–226)
EPITHELIAL CELLS, UA: ABNORMAL /HPF
FERRITIN: 45 NG/ML
GFR AFRICAN AMERICAN: 26
GFR NON-AFRICAN AMERICAN: 21 ML/MIN/1.73
GLUCOSE BLD-MCNC: 70 MG/DL (ref 74–99)
GLUCOSE URINE: NEGATIVE MG/DL
HBA1C MFR BLD: 5.5 % (ref 4–5.6)
HCT VFR BLD CALC: 42.4 % (ref 34–48)
HDLC SERPL-MCNC: 52 MG/DL
HEMOGLOBIN: 12.9 G/DL (ref 11.5–15.5)
IRON SATURATION: 23 % (ref 15–50)
IRON: 62 MCG/DL (ref 37–145)
KETONES, URINE: NEGATIVE MG/DL
LDL CHOLESTEROL CALCULATED: 94 MG/DL (ref 0–99)
LEUKOCYTE ESTERASE, URINE: ABNORMAL
MAGNESIUM: 2 MG/DL (ref 1.6–2.6)
MCH RBC QN AUTO: 27.4 PG (ref 26–35)
MCHC RBC AUTO-ENTMCNC: 30.4 % (ref 32–34.5)
MCV RBC AUTO: 90.2 FL (ref 80–99.9)
MICROALBUMIN UR-MCNC: 794.8 MG/L
MICROALBUMIN/CREAT UR-RTO: 786.9 (ref 0–30)
NITRITE, URINE: NEGATIVE
PARATHYROID HORMONE INTACT: 105 PG/ML (ref 15–65)
PDW BLD-RTO: 14.8 FL (ref 11.5–15)
PH UA: 7 (ref 5–9)
PHOSPHORUS: 3.2 MG/DL (ref 2.5–4.5)
PLATELET # BLD: 154 E9/L (ref 130–450)
PMV BLD AUTO: 12 FL (ref 7–12)
POTASSIUM SERPL-SCNC: 4.5 MMOL/L (ref 3.5–5)
PROTEIN UA: 100 MG/DL
RBC # BLD: 4.7 E12/L (ref 3.5–5.5)
RBC UA: ABNORMAL /HPF (ref 0–2)
SODIUM BLD-SCNC: 147 MMOL/L (ref 132–146)
SPECIFIC GRAVITY UA: 1.01 (ref 1–1.03)
TOTAL IRON BINDING CAPACITY: 268 MCG/DL (ref 250–450)
TOTAL PROTEIN: 7.3 G/DL (ref 6.4–8.3)
TRIGLYCERIDE, FASTING: 85 MG/DL (ref 0–149)
URIC ACID, SERUM: 8 MG/DL (ref 2.4–5.7)
UROBILINOGEN, URINE: 0.2 E.U./DL
VITAMIN D 25-HYDROXY: 33 NG/ML (ref 30–100)
VLDLC SERPL CALC-MCNC: 17 MG/DL
WBC # BLD: 5.3 E9/L (ref 4.5–11.5)
WBC UA: ABNORMAL /HPF (ref 0–5)

## 2019-09-13 PROCEDURE — 83540 ASSAY OF IRON: CPT

## 2019-09-13 PROCEDURE — 84550 ASSAY OF BLOOD/URIC ACID: CPT

## 2019-09-13 PROCEDURE — 82044 UR ALBUMIN SEMIQUANTITATIVE: CPT

## 2019-09-13 PROCEDURE — 36415 COLL VENOUS BLD VENIPUNCTURE: CPT

## 2019-09-13 PROCEDURE — 84100 ASSAY OF PHOSPHORUS: CPT

## 2019-09-13 PROCEDURE — 85027 COMPLETE CBC AUTOMATED: CPT

## 2019-09-13 PROCEDURE — 83550 IRON BINDING TEST: CPT

## 2019-09-13 PROCEDURE — 82306 VITAMIN D 25 HYDROXY: CPT

## 2019-09-13 PROCEDURE — 86141 C-REACTIVE PROTEIN HS: CPT

## 2019-09-13 PROCEDURE — 82728 ASSAY OF FERRITIN: CPT

## 2019-09-13 PROCEDURE — 83735 ASSAY OF MAGNESIUM: CPT

## 2019-09-13 PROCEDURE — 80053 COMPREHEN METABOLIC PANEL: CPT

## 2019-09-13 PROCEDURE — 83970 ASSAY OF PARATHORMONE: CPT

## 2019-09-13 PROCEDURE — 80061 LIPID PANEL: CPT

## 2019-09-13 PROCEDURE — 81001 URINALYSIS AUTO W/SCOPE: CPT

## 2019-09-13 PROCEDURE — 82330 ASSAY OF CALCIUM: CPT

## 2019-09-13 PROCEDURE — 82570 ASSAY OF URINE CREATININE: CPT

## 2019-09-13 PROCEDURE — 83036 HEMOGLOBIN GLYCOSYLATED A1C: CPT

## 2019-09-17 ENCOUNTER — HOSPITAL ENCOUNTER (OUTPATIENT)
Dept: NON INVASIVE DIAGNOSTICS | Age: 64
Discharge: HOME | End: 2019-09-17
Payer: MEDICARE

## 2019-09-17 ENCOUNTER — ANESTHESIA (OUTPATIENT)
Dept: CARDIAC CATH/INVASIVE PROCEDURES | Age: 64
End: 2019-09-17

## 2019-09-17 ENCOUNTER — ANESTHESIA EVENT (OUTPATIENT)
Dept: CARDIAC CATH/INVASIVE PROCEDURES | Age: 64
End: 2019-09-17

## 2019-09-17 ENCOUNTER — HOSPITAL ENCOUNTER (OUTPATIENT)
Dept: CARDIAC CATH/INVASIVE PROCEDURES | Age: 64
Discharge: HOME | End: 2019-09-17
Payer: MEDICARE

## 2019-09-17 VITALS — DIASTOLIC BLOOD PRESSURE: 90 MMHG | SYSTOLIC BLOOD PRESSURE: 179 MMHG | OXYGEN SATURATION: 99 %

## 2019-09-17 PROBLEM — I48.91 ATRIAL FIBRILLATION (HCC): Status: ACTIVE | Noted: 2019-09-17

## 2019-09-17 LAB
ANION GAP SERPL CALCULATED.3IONS-SCNC: 9 MMOL/L (ref 7–16)
BASOPHILS ABSOLUTE: 0.03 E9/L (ref 0–0.2)
BASOPHILS RELATIVE PERCENT: 0.6 % (ref 0–2)
BUN BLDV-MCNC: 38 MG/DL (ref 8–23)
CALCIUM SERPL-MCNC: 9.8 MG/DL (ref 8.6–10.2)
CHLORIDE BLD-SCNC: 105 MMOL/L (ref 98–107)
CO2: 30 MMOL/L (ref 22–29)
CREAT SERPL-MCNC: 2.2 MG/DL (ref 0.5–1)
EOSINOPHILS ABSOLUTE: 0.1 E9/L (ref 0.05–0.5)
EOSINOPHILS RELATIVE PERCENT: 2.1 % (ref 0–6)
GFR AFRICAN AMERICAN: 27
GFR NON-AFRICAN AMERICAN: 22 ML/MIN/1.73
GLUCOSE BLD-MCNC: 121 MG/DL (ref 74–99)
HCT VFR BLD CALC: 39.3 % (ref 34–48)
HEMOGLOBIN: 12 G/DL (ref 11.5–15.5)
IMMATURE GRANULOCYTES #: 0.02 E9/L
IMMATURE GRANULOCYTES %: 0.4 % (ref 0–5)
INR BLD: 1.4
LV EF: 66 %
LVEF MODALITY: NORMAL
LYMPHOCYTES ABSOLUTE: 0.89 E9/L (ref 1.5–4)
LYMPHOCYTES RELATIVE PERCENT: 19 % (ref 20–42)
MCH RBC QN AUTO: 27.8 PG (ref 26–35)
MCHC RBC AUTO-ENTMCNC: 30.5 % (ref 32–34.5)
MCV RBC AUTO: 91 FL (ref 80–99.9)
MONOCYTES ABSOLUTE: 0.35 E9/L (ref 0.1–0.95)
MONOCYTES RELATIVE PERCENT: 7.5 % (ref 2–12)
NEUTROPHILS ABSOLUTE: 3.3 E9/L (ref 1.8–7.3)
NEUTROPHILS RELATIVE PERCENT: 70.4 % (ref 43–80)
PDW BLD-RTO: 14.9 FL (ref 11.5–15)
PLATELET # BLD: 151 E9/L (ref 130–450)
PMV BLD AUTO: 12.1 FL (ref 7–12)
POTASSIUM SERPL-SCNC: 4.1 MMOL/L (ref 3.5–5)
PROTHROMBIN TIME: 15.4 SEC (ref 9.3–12.4)
RBC # BLD: 4.32 E12/L (ref 3.5–5.5)
SODIUM BLD-SCNC: 144 MMOL/L (ref 132–146)
WBC # BLD: 4.7 E9/L (ref 4.5–11.5)

## 2019-09-17 PROCEDURE — 3700000001 HC ADD 15 MINUTES (ANESTHESIA)

## 2019-09-17 PROCEDURE — 2580000003 HC RX 258: Performed by: INTERNAL MEDICINE

## 2019-09-17 PROCEDURE — 85025 COMPLETE CBC W/AUTO DIFF WBC: CPT

## 2019-09-17 PROCEDURE — 80048 BASIC METABOLIC PNL TOTAL CA: CPT

## 2019-09-17 PROCEDURE — 92960 CARDIOVERSION ELECTRIC EXT: CPT | Performed by: INTERNAL MEDICINE

## 2019-09-17 PROCEDURE — 85610 PROTHROMBIN TIME: CPT

## 2019-09-17 PROCEDURE — 36415 COLL VENOUS BLD VENIPUNCTURE: CPT

## 2019-09-17 PROCEDURE — 3700000000 HC ANESTHESIA ATTENDED CARE

## 2019-09-17 PROCEDURE — 2709999900 HC NON-CHARGEABLE SUPPLY

## 2019-09-17 PROCEDURE — 93306 TTE W/DOPPLER COMPLETE: CPT

## 2019-09-17 PROCEDURE — 6360000002 HC RX W HCPCS: Performed by: NURSE ANESTHETIST, CERTIFIED REGISTERED

## 2019-09-17 RX ORDER — PROPOFOL 10 MG/ML
INJECTION, EMULSION INTRAVENOUS PRN
Status: DISCONTINUED | OUTPATIENT
Start: 2019-09-17 | End: 2019-09-17 | Stop reason: SDUPTHER

## 2019-09-17 RX ORDER — AMIODARONE HYDROCHLORIDE 200 MG/1
200 TABLET ORAL DAILY
Qty: 30 TABLET | Refills: 3 | Status: SHIPPED | OUTPATIENT
Start: 2019-09-17 | End: 2020-01-01

## 2019-09-17 RX ORDER — SODIUM CHLORIDE 9 MG/ML
INJECTION, SOLUTION INTRAVENOUS CONTINUOUS
Status: DISCONTINUED | OUTPATIENT
Start: 2019-09-17 | End: 2019-09-18 | Stop reason: HOSPADM

## 2019-09-17 RX ADMIN — SODIUM CHLORIDE: 9 INJECTION, SOLUTION INTRAVENOUS at 09:21

## 2019-09-17 RX ADMIN — PROPOFOL 60 MG: 10 INJECTION, EMULSION INTRAVENOUS at 10:00

## 2019-09-17 RX ADMIN — SODIUM CHLORIDE: 9 INJECTION, SOLUTION INTRAVENOUS at 09:55

## 2019-09-17 NOTE — ANESTHESIA POSTPROCEDURE EVALUATION
Department of Anesthesiology  Postprocedure Note    Patient: Omaira Stoner  MRN: [id number]  YOB: 1955  Date of evaluation: 9/17/2019  Time:  10:34 AM     Procedure Summary     Date:  09/17/19 [place]  [facility] [place]    Anesthesia Start:  1845 Anesthesia Stop:  1011    Procedure:  CARDIOVERSION WITH ANESTHESIA Diagnosis:  Unspecified atrial fibrillation    Scheduled Providers:   Responsible Provider:  Tiffanie Headley DO    Anesthesia Type:  MAC ASA Status:  4          Anesthesia Type: MAC    Lupillo Phase I:      Lupillo Phase II:      Last vitals: Reviewed and per EMR flowsheets.        Anesthesia Post Evaluation    Patient location during evaluation: bedside  Patient participation: complete - patient cannot participate  Level of consciousness: awake and alert  Airway patency: patent  Nausea & Vomiting: no nausea and no vomiting  Complications: no  Cardiovascular status: blood pressure returned to baseline  Respiratory status: acceptable  Hydration status: euvolemic

## 2019-09-17 NOTE — ANESTHESIA PRE PROCEDURE
Department of Anesthesiology  Preprocedure Note       Name:  Omaira Stoner   Age:  61 y.o.  :  1955                                          MRN:  [id number]         Date:  2019      Surgeon: * No surgeons listed *    Procedure: CARDIOVERSION WITH ANESTHESIA    Medications prior to admission:   Prior to Admission medications    Medication Sig Start Date End Date Taking? Authorizing Provider   amiodarone (CORDARONE) 200 MG tablet Take 1 tablet by mouth daily 19  Yes Lloyd Ladd MD   apixaban (ELIQUIS) 5 MG TABS tablet Take 1 tablet by mouth 2 times daily Pt States ok to continue preop per Dr Semaj Aguilera. 18  Yes Tammi Gonzalez MD   insulin glargine (LANTUS) 100 UNIT/ML injection vial Inject 35 Units into the skin nightly    Yes Historical Provider, MD   magnesium oxide (MAG-OX) 400 MG tablet Take 400 mg by mouth daily   Yes Historical Provider, MD   hydrALAZINE (APRESOLINE) 50 MG tablet Take 50 mg by mouth 3 times daily    Yes Historical Provider, MD   furosemide (LASIX) 40 MG tablet Take 40 mg by mouth daily    Yes Historical Provider, MD   isosorbide mononitrate (IMDUR) 60 MG CR tablet Take 60 mg by mouth daily    Yes Historical Provider, MD   sodium bicarbonate 650 MG tablet Take 1,300 mg by mouth 2 times daily   Yes Historical Provider, MD   vitamin B-12 (CYANOCOBALAMIN) 1000 MCG tablet Take 500 mcg by mouth 2 times daily. Yes Historical Provider, MD   loratadine (CLARITIN) 10 MG tablet TAKE ONE TABLET BY MOUTH EVERY DAY AS NEEDED  Patient taking differently: Take 10 mg by mouth daily  13  Yes Santa Lee MD   pravastatin (PRAVACHOL) 40 MG tablet Take 1 tablet by mouth daily. Patient taking differently: Take 80 mg by mouth daily  4/3/13  Yes Ed Ledezma MD   Coenzyme Q-10 100 MG CAPS Take 1 capsule by mouth daily. 12  Yes Tressia Klinefelter, MD   INSULIN SYRINGE .5CC/29G (ACCUSURE INS SYR .5CC/29GX1/2\") 29G X 1/2\" 0.5 ML MISC by Does not apply route.  12   [address] [facility]    same time as cholecystectomy    CARDIOVERSION  2019    CATARACT REMOVAL WITH IMPLANT Left 2014    CATARACT REMOVAL WITH IMPLANT  14     [facility]   [address] [facility]    COLONOSCOPY  2012    diarrhea, irritable bowel syndrome, anal polyp biopsied, Dr. Nas Gold, [address] Left 2019    AV FISTULA CREATION  LEFT ARM performed by Raymond Weathers MD at [address] [facility] Left 2019    AV FISTULA REVISION -- LEFT ARM performed by Raymond Weathers MD at [facility], COLON, DIAGNOSTIC      colonoscopy    EYE SURGERY      cataract bilateral    [address]    same time as C section, ? left ovary    TUBAL LIGATION  1987    same time as C section       Social History:    Social History     Tobacco Use    Smoking status: Never Smoker    Smokeless tobacco: Never Used   Substance Use Topics    Alcohol use: No                                Counseling given: Not Answered      Vital Signs (Current): There were no vitals filed for this visit.                                            BP Readings from Last 3 Encounters:   19 (!) 179/90   19 (!) 112/56   19 (!) 172/78       NPO Status: Time of last liquid consumption: 173                        Time of last solid consumption:                         Date of last liquid consumption: 19                        Date of last solid food consumption: 19    BMI:   Wt Readings from Last 3 Encounters:   19 245 lb (111.1 kg)   19 240 lb (108.9 kg)   19 240 lb (108.9 kg)     There is no height or weight on file to calculate BMI.    CBC:   Lab Results   Component Value Date    WBC 4.7 2019    RBC 4.32 2019    HGB 12.0 2019    HCT 39.3 2019    MCV 91.0 2019    RDW 14.9 2019     2019       CMP:   Lab Results

## 2019-10-02 ENCOUNTER — OFFICE VISIT (OUTPATIENT)
Dept: VASCULAR SURGERY | Age: 64
End: 2019-10-02

## 2019-10-02 VITALS — HEART RATE: 68 BPM | DIASTOLIC BLOOD PRESSURE: 78 MMHG | SYSTOLIC BLOOD PRESSURE: 112 MMHG

## 2019-10-02 DIAGNOSIS — N18.6 ESRD (END STAGE RENAL DISEASE) (HCC): Primary | ICD-10-CM

## 2019-10-02 PROCEDURE — 99024 POSTOP FOLLOW-UP VISIT: CPT | Performed by: NURSE PRACTITIONER

## 2019-10-02 RX ORDER — OMEPRAZOLE 20 MG/1
CAPSULE, DELAYED RELEASE ORAL
Refills: 3 | COMMUNITY
Start: 2019-08-31 | End: 2020-01-01

## 2019-10-28 ENCOUNTER — TELEPHONE (OUTPATIENT)
Dept: CARDIAC CATH/INVASIVE PROCEDURES | Age: 64
End: 2019-10-28

## 2019-10-29 ENCOUNTER — HOSPITAL ENCOUNTER (OUTPATIENT)
Dept: CARDIAC CATH/INVASIVE PROCEDURES | Age: 64
Discharge: HOME | End: 2019-10-29
Payer: MEDICARE

## 2019-10-29 ENCOUNTER — ANESTHESIA EVENT (OUTPATIENT)
Dept: CARDIAC CATH/INVASIVE PROCEDURES | Age: 64
End: 2019-10-29

## 2019-10-29 ENCOUNTER — ANESTHESIA (OUTPATIENT)
Dept: CARDIAC CATH/INVASIVE PROCEDURES | Age: 64
End: 2019-10-29

## 2019-10-29 VITALS
OXYGEN SATURATION: 97 % | SYSTOLIC BLOOD PRESSURE: 146 MMHG | TEMPERATURE: 97.8 F | HEART RATE: 67 BPM | BODY MASS INDEX: 40.97 KG/M2 | HEIGHT: 64 IN | RESPIRATION RATE: 24 BRPM | DIASTOLIC BLOOD PRESSURE: 67 MMHG | WEIGHT: 240 LBS

## 2019-10-29 VITALS — OXYGEN SATURATION: 98 % | SYSTOLIC BLOOD PRESSURE: 150 MMHG | DIASTOLIC BLOOD PRESSURE: 69 MMHG

## 2019-10-29 LAB
BASOPHILS ABSOLUTE: 0.06 E9/L (ref 0–0.2)
BASOPHILS RELATIVE PERCENT: 1.2 % (ref 0–2)
EOSINOPHILS ABSOLUTE: 0.07 E9/L (ref 0.05–0.5)
EOSINOPHILS RELATIVE PERCENT: 1.4 % (ref 0–6)
HCT VFR BLD CALC: 39.6 % (ref 34–48)
HEMOGLOBIN: 12.2 G/DL (ref 11.5–15.5)
IMMATURE GRANULOCYTES #: 0.01 E9/L
IMMATURE GRANULOCYTES %: 0.2 % (ref 0–5)
LYMPHOCYTES ABSOLUTE: 1.29 E9/L (ref 1.5–4)
LYMPHOCYTES RELATIVE PERCENT: 25.4 % (ref 20–42)
MCH RBC QN AUTO: 28.2 PG (ref 26–35)
MCHC RBC AUTO-ENTMCNC: 30.8 % (ref 32–34.5)
MCV RBC AUTO: 91.7 FL (ref 80–99.9)
MONOCYTES ABSOLUTE: 0.44 E9/L (ref 0.1–0.95)
MONOCYTES RELATIVE PERCENT: 8.7 % (ref 2–12)
NEUTROPHILS ABSOLUTE: 3.21 E9/L (ref 1.8–7.3)
NEUTROPHILS RELATIVE PERCENT: 63.1 % (ref 43–80)
PDW BLD-RTO: 15 FL (ref 11.5–15)
PLATELET # BLD: 160 E9/L (ref 130–450)
PMV BLD AUTO: 12.1 FL (ref 7–12)
RBC # BLD: 4.32 E12/L (ref 3.5–5.5)
REASON FOR REJECTION: NORMAL
REJECTED TEST: NORMAL
WBC # BLD: 5.1 E9/L (ref 4.5–11.5)

## 2019-10-29 PROCEDURE — 92960 CARDIOVERSION ELECTRIC EXT: CPT | Performed by: INTERNAL MEDICINE

## 2019-10-29 PROCEDURE — 6360000002 HC RX W HCPCS: Performed by: NURSE ANESTHETIST, CERTIFIED REGISTERED

## 2019-10-29 PROCEDURE — 2709999900 HC NON-CHARGEABLE SUPPLY

## 2019-10-29 PROCEDURE — 3700000000 HC ANESTHESIA ATTENDED CARE

## 2019-10-29 PROCEDURE — 36415 COLL VENOUS BLD VENIPUNCTURE: CPT

## 2019-10-29 PROCEDURE — 85025 COMPLETE CBC W/AUTO DIFF WBC: CPT

## 2019-10-29 RX ORDER — PROPOFOL 10 MG/ML
INJECTION, EMULSION INTRAVENOUS PRN
Status: DISCONTINUED | OUTPATIENT
Start: 2019-10-29 | End: 2019-10-29 | Stop reason: SDUPTHER

## 2019-10-29 RX ADMIN — PROPOFOL 40 MG: 10 INJECTION, EMULSION INTRAVENOUS at 15:21

## 2019-11-06 ENCOUNTER — OFFICE VISIT (OUTPATIENT)
Dept: VASCULAR SURGERY | Age: 64
End: 2019-11-06

## 2019-11-06 VITALS
HEART RATE: 70 BPM | SYSTOLIC BLOOD PRESSURE: 158 MMHG | WEIGHT: 240 LBS | BODY MASS INDEX: 40.97 KG/M2 | HEIGHT: 64 IN | DIASTOLIC BLOOD PRESSURE: 64 MMHG

## 2019-11-06 DIAGNOSIS — N18.4 STAGE 4 CHRONIC KIDNEY DISEASE (HCC): Primary | ICD-10-CM

## 2019-11-06 PROCEDURE — 99024 POSTOP FOLLOW-UP VISIT: CPT | Performed by: SURGERY

## 2019-11-22 ENCOUNTER — TELEPHONE (OUTPATIENT)
Dept: CARDIAC CATH/INVASIVE PROCEDURES | Age: 64
End: 2019-11-22

## 2019-12-09 ENCOUNTER — HOSPITAL ENCOUNTER (OUTPATIENT)
Dept: CARDIAC CATH/INVASIVE PROCEDURES | Age: 64
Discharge: HOME | End: 2019-12-09
Payer: MEDICARE

## 2019-12-09 VITALS — WEIGHT: 240 LBS | BODY MASS INDEX: 40.97 KG/M2 | HEIGHT: 64 IN

## 2019-12-09 DIAGNOSIS — T82.858A ARTERIOVENOUS FISTULA STENOSIS, INITIAL ENCOUNTER (HCC): Chronic | ICD-10-CM

## 2019-12-09 PROCEDURE — 36902 INTRO CATH DIALYSIS CIRCUIT: CPT | Performed by: SURGERY

## 2019-12-09 PROCEDURE — 6360000002 HC RX W HCPCS

## 2019-12-09 PROCEDURE — C1894 INTRO/SHEATH, NON-LASER: HCPCS

## 2019-12-09 PROCEDURE — C1725 CATH, TRANSLUMIN NON-LASER: HCPCS

## 2019-12-09 PROCEDURE — C1769 GUIDE WIRE: HCPCS

## 2019-12-09 PROCEDURE — 2500000003 HC RX 250 WO HCPCS

## 2019-12-09 PROCEDURE — 2709999900 HC NON-CHARGEABLE SUPPLY

## 2019-12-18 ENCOUNTER — OFFICE VISIT (OUTPATIENT)
Dept: VASCULAR SURGERY | Age: 64
End: 2019-12-18
Payer: MEDICARE

## 2019-12-18 DIAGNOSIS — Z99.2 ENCOUNTER REGARDING VASCULAR ACCESS FOR DIALYSIS FOR ESRD (HCC): Primary | ICD-10-CM

## 2019-12-18 DIAGNOSIS — N18.6 ENCOUNTER REGARDING VASCULAR ACCESS FOR DIALYSIS FOR ESRD (HCC): Primary | ICD-10-CM

## 2019-12-18 PROCEDURE — 99213 OFFICE O/P EST LOW 20 MIN: CPT | Performed by: PHYSICIAN ASSISTANT

## 2019-12-18 RX ORDER — METOPROLOL SUCCINATE 25 MG/1
25 TABLET, EXTENDED RELEASE ORAL DAILY
COMMUNITY
Start: 2019-12-06 | End: 2020-01-01

## 2019-12-18 RX ORDER — CEPHALEXIN 250 MG/1
250 CAPSULE ORAL 2 TIMES DAILY
Qty: 20 CAPSULE | Refills: 0 | Status: SHIPPED | OUTPATIENT
Start: 2019-12-18 | End: 2020-01-01

## 2020-01-01 ENCOUNTER — HOSPITAL ENCOUNTER (INPATIENT)
Age: 65
LOS: 1 days | Discharge: HOME | DRG: 065 | End: 2020-12-17
Attending: EMERGENCY MEDICINE | Admitting: FAMILY MEDICINE
Payer: MEDICARE

## 2020-01-01 ENCOUNTER — TELEPHONE (OUTPATIENT)
Dept: VASCULAR SURGERY | Age: 65
End: 2020-01-01

## 2020-01-01 ENCOUNTER — OFFICE VISIT (OUTPATIENT)
Dept: VASCULAR SURGERY | Age: 65
End: 2020-01-01
Payer: MEDICARE

## 2020-01-01 ENCOUNTER — HOSPITAL ENCOUNTER (OUTPATIENT)
Dept: CARDIOLOGY | Age: 65
Discharge: HOME | End: 2020-11-04
Payer: MEDICARE

## 2020-01-01 ENCOUNTER — ANESTHESIA (OUTPATIENT)
Dept: OPERATING ROOM | Age: 65
End: 2020-01-01
Payer: MEDICARE

## 2020-01-01 ENCOUNTER — APPOINTMENT (OUTPATIENT)
Dept: MRI IMAGING | Age: 65
DRG: 065 | End: 2020-01-01
Payer: MEDICARE

## 2020-01-01 ENCOUNTER — HOSPITAL ENCOUNTER (OUTPATIENT)
Age: 65
Setting detail: OUTPATIENT SURGERY
Discharge: HOME | End: 2020-12-10
Attending: SURGERY | Admitting: SURGERY
Payer: MEDICARE

## 2020-01-01 ENCOUNTER — APPOINTMENT (OUTPATIENT)
Dept: CT IMAGING | Age: 65
DRG: 065 | End: 2020-01-01
Payer: MEDICARE

## 2020-01-01 ENCOUNTER — HOSPITAL ENCOUNTER (OUTPATIENT)
Dept: CARDIAC CATH/INVASIVE PROCEDURES | Age: 65
Discharge: HOME | End: 2020-10-12
Payer: MEDICARE

## 2020-01-01 ENCOUNTER — ANESTHESIA EVENT (OUTPATIENT)
Dept: OPERATING ROOM | Age: 65
End: 2020-01-01
Payer: MEDICARE

## 2020-01-01 ENCOUNTER — HOSPITAL ENCOUNTER (OUTPATIENT)
Age: 65
Discharge: HOME | End: 2020-12-05
Payer: MEDICARE

## 2020-01-01 VITALS
RESPIRATION RATE: 16 BRPM | DIASTOLIC BLOOD PRESSURE: 80 MMHG | WEIGHT: 260 LBS | BODY MASS INDEX: 44.39 KG/M2 | HEIGHT: 64 IN | SYSTOLIC BLOOD PRESSURE: 130 MMHG

## 2020-01-01 VITALS
HEIGHT: 64 IN | WEIGHT: 260 LBS | SYSTOLIC BLOOD PRESSURE: 156 MMHG | BODY MASS INDEX: 44.39 KG/M2 | HEART RATE: 53 BPM | DIASTOLIC BLOOD PRESSURE: 68 MMHG | OXYGEN SATURATION: 94 % | RESPIRATION RATE: 18 BRPM | TEMPERATURE: 97.3 F

## 2020-01-01 VITALS
DIASTOLIC BLOOD PRESSURE: 50 MMHG | SYSTOLIC BLOOD PRESSURE: 145 MMHG | OXYGEN SATURATION: 97 % | RESPIRATION RATE: 18 BRPM | WEIGHT: 260 LBS | TEMPERATURE: 98.2 F | BODY MASS INDEX: 44.39 KG/M2 | HEIGHT: 64 IN | HEART RATE: 54 BPM

## 2020-01-01 VITALS — WEIGHT: 270 LBS | BODY MASS INDEX: 46.1 KG/M2 | HEIGHT: 64 IN

## 2020-01-01 VITALS
SYSTOLIC BLOOD PRESSURE: 135 MMHG | DIASTOLIC BLOOD PRESSURE: 59 MMHG | OXYGEN SATURATION: 94 % | RESPIRATION RATE: 11 BRPM

## 2020-01-01 VITALS
WEIGHT: 260 LBS | RESPIRATION RATE: 16 BRPM | SYSTOLIC BLOOD PRESSURE: 122 MMHG | HEIGHT: 64 IN | BODY MASS INDEX: 44.39 KG/M2 | DIASTOLIC BLOOD PRESSURE: 70 MMHG

## 2020-01-01 LAB
ABO/RH: NORMAL
ANION GAP SERPL CALCULATED.3IONS-SCNC: 7 MMOL/L (ref 7–16)
ANION GAP SERPL CALCULATED.3IONS-SCNC: 8 MMOL/L (ref 7–16)
ANION GAP SERPL CALCULATED.3IONS-SCNC: 9 MMOL/L (ref 7–16)
ANION GAP SERPL CALCULATED.3IONS-SCNC: 9 MMOL/L (ref 7–16)
ANTIBODY SCREEN: NORMAL
BASOPHILS ABSOLUTE: 0.02 E9/L (ref 0–0.2)
BASOPHILS RELATIVE PERCENT: 0.5 % (ref 0–2)
BUN BLDV-MCNC: 36 MG/DL (ref 8–23)
BUN BLDV-MCNC: 40 MG/DL (ref 8–23)
BUN BLDV-MCNC: 45 MG/DL (ref 8–23)
BUN BLDV-MCNC: 47 MG/DL (ref 8–23)
CALCIUM SERPL-MCNC: 8.7 MG/DL (ref 8.6–10.2)
CALCIUM SERPL-MCNC: 8.8 MG/DL (ref 8.6–10.2)
CALCIUM SERPL-MCNC: 8.8 MG/DL (ref 8.6–10.2)
CALCIUM SERPL-MCNC: 8.9 MG/DL (ref 8.6–10.2)
CHLORIDE BLD-SCNC: 104 MMOL/L (ref 98–107)
CHLORIDE BLD-SCNC: 107 MMOL/L (ref 98–107)
CHLORIDE BLD-SCNC: 108 MMOL/L (ref 98–107)
CHLORIDE BLD-SCNC: 111 MMOL/L (ref 98–107)
CHOLESTEROL, TOTAL: 108 MG/DL (ref 0–199)
CO2: 22 MMOL/L (ref 22–29)
CO2: 25 MMOL/L (ref 22–29)
CO2: 25 MMOL/L (ref 22–29)
CO2: 28 MMOL/L (ref 22–29)
CREAT SERPL-MCNC: 2.1 MG/DL (ref 0.5–1)
CREAT SERPL-MCNC: 2.1 MG/DL (ref 0.5–1)
CREAT SERPL-MCNC: 2.3 MG/DL (ref 0.5–1)
CREAT SERPL-MCNC: 2.4 MG/DL (ref 0.5–1)
EKG ATRIAL RATE: 125 BPM
EKG Q-T INTERVAL: 446 MS
EKG QRS DURATION: 148 MS
EKG QTC CALCULATION (BAZETT): 563 MS
EKG R AXIS: 48 DEGREES
EKG T AXIS: 16 DEGREES
EKG VENTRICULAR RATE: 96 BPM
EOSINOPHILS ABSOLUTE: 0.18 E9/L (ref 0.05–0.5)
EOSINOPHILS RELATIVE PERCENT: 4.3 % (ref 0–6)
GFR AFRICAN AMERICAN: 25
GFR AFRICAN AMERICAN: 26
GFR AFRICAN AMERICAN: 29
GFR AFRICAN AMERICAN: 29
GFR NON-AFRICAN AMERICAN: 20 ML/MIN/1.73
GFR NON-AFRICAN AMERICAN: 21 ML/MIN/1.73
GFR NON-AFRICAN AMERICAN: 24 ML/MIN/1.73
GFR NON-AFRICAN AMERICAN: 24 ML/MIN/1.73
GLUCOSE BLD-MCNC: 132 MG/DL (ref 74–99)
GLUCOSE BLD-MCNC: 83 MG/DL (ref 74–99)
GLUCOSE BLD-MCNC: 86 MG/DL (ref 74–99)
GLUCOSE BLD-MCNC: 97 MG/DL (ref 74–99)
HBA1C MFR BLD: 7 % (ref 4–5.6)
HCT VFR BLD CALC: 34.8 % (ref 34–48)
HCT VFR BLD CALC: 35 % (ref 34–48)
HCT VFR BLD CALC: 36 % (ref 34–48)
HCT VFR BLD CALC: 39.4 % (ref 34–48)
HDLC SERPL-MCNC: 39 MG/DL
HEMOGLOBIN: 10.6 G/DL (ref 11.5–15.5)
HEMOGLOBIN: 10.9 G/DL (ref 11.5–15.5)
HEMOGLOBIN: 11.3 G/DL (ref 11.5–15.5)
HEMOGLOBIN: 12 G/DL (ref 11.5–15.5)
IMMATURE GRANULOCYTES #: 0.01 E9/L
IMMATURE GRANULOCYTES %: 0.2 % (ref 0–5)
INR BLD: 0.9
INR BLD: 1.3
LDL CHOLESTEROL CALCULATED: 49 MG/DL (ref 0–99)
LYMPHOCYTES ABSOLUTE: 0.86 E9/L (ref 1.5–4)
LYMPHOCYTES RELATIVE PERCENT: 20.8 % (ref 20–42)
MAGNESIUM: 2.2 MG/DL (ref 1.6–2.6)
MCH RBC QN AUTO: 28.2 PG (ref 26–35)
MCH RBC QN AUTO: 28.3 PG (ref 26–35)
MCH RBC QN AUTO: 28.7 PG (ref 26–35)
MCH RBC QN AUTO: 28.8 PG (ref 26–35)
MCHC RBC AUTO-ENTMCNC: 30.3 % (ref 32–34.5)
MCHC RBC AUTO-ENTMCNC: 30.5 % (ref 32–34.5)
MCHC RBC AUTO-ENTMCNC: 31.3 % (ref 32–34.5)
MCHC RBC AUTO-ENTMCNC: 31.4 % (ref 32–34.5)
MCV RBC AUTO: 91.6 FL (ref 80–99.9)
MCV RBC AUTO: 91.8 FL (ref 80–99.9)
MCV RBC AUTO: 92.5 FL (ref 80–99.9)
MCV RBC AUTO: 93.3 FL (ref 80–99.9)
METER GLUCOSE: 113 MG/DL (ref 74–99)
METER GLUCOSE: 114 MG/DL (ref 74–99)
METER GLUCOSE: 126 MG/DL (ref 74–99)
METER GLUCOSE: 129 MG/DL (ref 74–99)
METER GLUCOSE: 135 MG/DL (ref 74–99)
METER GLUCOSE: 217 MG/DL (ref 74–99)
METER GLUCOSE: 76 MG/DL (ref 74–99)
METER GLUCOSE: 93 MG/DL (ref 74–99)
MONOCYTES ABSOLUTE: 0.33 E9/L (ref 0.1–0.95)
MONOCYTES RELATIVE PERCENT: 8 % (ref 2–12)
NEUTROPHILS ABSOLUTE: 2.74 E9/L (ref 1.8–7.3)
NEUTROPHILS RELATIVE PERCENT: 66.2 % (ref 43–80)
PDW BLD-RTO: 15.1 FL (ref 11.5–15)
PDW BLD-RTO: 15.7 FL (ref 11.5–15)
PDW BLD-RTO: 15.8 FL (ref 11.5–15)
PDW BLD-RTO: 15.9 FL (ref 11.5–15)
PLATELET # BLD: 133 E9/L (ref 130–450)
PLATELET # BLD: 137 E9/L (ref 130–450)
PLATELET # BLD: 138 E9/L (ref 130–450)
PLATELET # BLD: 151 E9/L (ref 130–450)
PMV BLD AUTO: 11.3 FL (ref 7–12)
PMV BLD AUTO: 11.5 FL (ref 7–12)
PMV BLD AUTO: 11.7 FL (ref 7–12)
PMV BLD AUTO: 12.1 FL (ref 7–12)
POTASSIUM REFLEX MAGNESIUM: 4.5 MMOL/L (ref 3.5–5)
POTASSIUM SERPL-SCNC: 4.3 MMOL/L (ref 3.5–5)
POTASSIUM SERPL-SCNC: 4.6 MMOL/L (ref 3.5–5)
POTASSIUM SERPL-SCNC: 4.7 MMOL/L (ref 3.5–5)
PROTHROMBIN TIME: 10.6 SEC (ref 9.3–12.4)
PROTHROMBIN TIME: 15.1 SEC (ref 9.3–12.4)
RBC # BLD: 3.75 E12/L (ref 3.5–5.5)
RBC # BLD: 3.8 E12/L (ref 3.5–5.5)
RBC # BLD: 3.92 E12/L (ref 3.5–5.5)
RBC # BLD: 4.26 E12/L (ref 3.5–5.5)
SARS-COV-2: NOT DETECTED
SODIUM BLD-SCNC: 140 MMOL/L (ref 132–146)
SODIUM BLD-SCNC: 140 MMOL/L (ref 132–146)
SODIUM BLD-SCNC: 141 MMOL/L (ref 132–146)
SODIUM BLD-SCNC: 142 MMOL/L (ref 132–146)
SOURCE: NORMAL
TRIGL SERPL-MCNC: 102 MG/DL (ref 0–149)
TROPONIN: <0.01 NG/ML (ref 0–0.03)
VLDLC SERPL CALC-MCNC: 20 MG/DL
WBC # BLD: 4 E9/L (ref 4.5–11.5)
WBC # BLD: 4.1 E9/L (ref 4.5–11.5)
WBC # BLD: 4.8 E9/L (ref 4.5–11.5)
WBC # BLD: 5.1 E9/L (ref 4.5–11.5)

## 2020-01-01 PROCEDURE — 2580000003 HC RX 258: Performed by: SURGERY

## 2020-01-01 PROCEDURE — 2060000000 HC ICU INTERMEDIATE R&B

## 2020-01-01 PROCEDURE — 6360000002 HC RX W HCPCS: Performed by: SURGERY

## 2020-01-01 PROCEDURE — 82962 GLUCOSE BLOOD TEST: CPT

## 2020-01-01 PROCEDURE — 80048 BASIC METABOLIC PNL TOTAL CA: CPT

## 2020-01-01 PROCEDURE — 36821 AV FUSION DIRECT ANY SITE: CPT | Performed by: SURGERY

## 2020-01-01 PROCEDURE — 85027 COMPLETE CBC AUTOMATED: CPT

## 2020-01-01 PROCEDURE — 7100000011 HC PHASE II RECOVERY - ADDTL 15 MIN: Performed by: SURGERY

## 2020-01-01 PROCEDURE — 99213 OFFICE O/P EST LOW 20 MIN: CPT | Performed by: PHYSICIAN ASSISTANT

## 2020-01-01 PROCEDURE — G0378 HOSPITAL OBSERVATION PER HR: HCPCS

## 2020-01-01 PROCEDURE — 6370000000 HC RX 637 (ALT 250 FOR IP): Performed by: FAMILY MEDICINE

## 2020-01-01 PROCEDURE — 86850 RBC ANTIBODY SCREEN: CPT

## 2020-01-01 PROCEDURE — 2709999900 HC NON-CHARGEABLE SUPPLY

## 2020-01-01 PROCEDURE — 85610 PROTHROMBIN TIME: CPT

## 2020-01-01 PROCEDURE — 99213 OFFICE O/P EST LOW 20 MIN: CPT | Performed by: SURGERY

## 2020-01-01 PROCEDURE — 36901 INTRO CATH DIALYSIS CIRCUIT: CPT | Performed by: SURGERY

## 2020-01-01 PROCEDURE — 2709999900 HC NON-CHARGEABLE SUPPLY: Performed by: SURGERY

## 2020-01-01 PROCEDURE — 86900 BLOOD TYPING SEROLOGIC ABO: CPT

## 2020-01-01 PROCEDURE — 6360000002 HC RX W HCPCS: Performed by: NURSE PRACTITIONER

## 2020-01-01 PROCEDURE — 70498 CT ANGIOGRAPHY NECK: CPT

## 2020-01-01 PROCEDURE — 70496 CT ANGIOGRAPHY HEAD: CPT

## 2020-01-01 PROCEDURE — 99284 EMERGENCY DEPT VISIT MOD MDM: CPT

## 2020-01-01 PROCEDURE — 84484 ASSAY OF TROPONIN QUANT: CPT

## 2020-01-01 PROCEDURE — 72125 CT NECK SPINE W/O DYE: CPT

## 2020-01-01 PROCEDURE — 70450 CT HEAD/BRAIN W/O DYE: CPT

## 2020-01-01 PROCEDURE — 93005 ELECTROCARDIOGRAM TRACING: CPT | Performed by: NURSE PRACTITIONER

## 2020-01-01 PROCEDURE — 3600000012 HC SURGERY LEVEL 2 ADDTL 15MIN: Performed by: SURGERY

## 2020-01-01 PROCEDURE — 3600000002 HC SURGERY LEVEL 2 BASE: Performed by: SURGERY

## 2020-01-01 PROCEDURE — 99232 SBSQ HOSP IP/OBS MODERATE 35: CPT | Performed by: PHYSICIAN ASSISTANT

## 2020-01-01 PROCEDURE — 83036 HEMOGLOBIN GLYCOSYLATED A1C: CPT

## 2020-01-01 PROCEDURE — 83735 ASSAY OF MAGNESIUM: CPT

## 2020-01-01 PROCEDURE — 2580000003 HC RX 258: Performed by: STUDENT IN AN ORGANIZED HEALTH CARE EDUCATION/TRAINING PROGRAM

## 2020-01-01 PROCEDURE — 36415 COLL VENOUS BLD VENIPUNCTURE: CPT

## 2020-01-01 PROCEDURE — U0003 INFECTIOUS AGENT DETECTION BY NUCLEIC ACID (DNA OR RNA); SEVERE ACUTE RESPIRATORY SYNDROME CORONAVIRUS 2 (SARS-COV-2) (CORONAVIRUS DISEASE [COVID-19]), AMPLIFIED PROBE TECHNIQUE, MAKING USE OF HIGH THROUGHPUT TECHNOLOGIES AS DESCRIBED BY CMS-2020-01-R: HCPCS

## 2020-01-01 PROCEDURE — 70551 MRI BRAIN STEM W/O DYE: CPT

## 2020-01-01 PROCEDURE — C1894 INTRO/SHEATH, NON-LASER: HCPCS

## 2020-01-01 PROCEDURE — 3700000001 HC ADD 15 MINUTES (ANESTHESIA): Performed by: SURGERY

## 2020-01-01 PROCEDURE — 93010 ELECTROCARDIOGRAM REPORT: CPT | Performed by: INTERNAL MEDICINE

## 2020-01-01 PROCEDURE — 80061 LIPID PANEL: CPT

## 2020-01-01 PROCEDURE — 3700000000 HC ANESTHESIA ATTENDED CARE: Performed by: SURGERY

## 2020-01-01 PROCEDURE — 93971 EXTREMITY STUDY: CPT

## 2020-01-01 PROCEDURE — 6360000004 HC RX CONTRAST MEDICATION: Performed by: STUDENT IN AN ORGANIZED HEALTH CARE EDUCATION/TRAINING PROGRAM

## 2020-01-01 PROCEDURE — 7100000010 HC PHASE II RECOVERY - FIRST 15 MIN: Performed by: SURGERY

## 2020-01-01 PROCEDURE — 2500000003 HC RX 250 WO HCPCS

## 2020-01-01 PROCEDURE — 97165 OT EVAL LOW COMPLEX 30 MIN: CPT

## 2020-01-01 PROCEDURE — 6360000002 HC RX W HCPCS: Performed by: ANESTHESIOLOGY

## 2020-01-01 PROCEDURE — 86901 BLOOD TYPING SEROLOGIC RH(D): CPT

## 2020-01-01 PROCEDURE — 64415 NJX AA&/STRD BRCH PLXS IMG: CPT | Performed by: ANESTHESIOLOGY

## 2020-01-01 PROCEDURE — 2500000003 HC RX 250 WO HCPCS: Performed by: SURGERY

## 2020-01-01 PROCEDURE — 99221 1ST HOSP IP/OBS SF/LOW 40: CPT | Performed by: PSYCHIATRY & NEUROLOGY

## 2020-01-01 PROCEDURE — 6360000002 HC RX W HCPCS

## 2020-01-01 PROCEDURE — 2580000003 HC RX 258: Performed by: NURSE PRACTITIONER

## 2020-01-01 PROCEDURE — 85025 COMPLETE CBC W/AUTO DIFF WBC: CPT

## 2020-01-01 PROCEDURE — 97161 PT EVAL LOW COMPLEX 20 MIN: CPT

## 2020-01-01 RX ORDER — FEBUXOSTAT 40 MG/1
40 TABLET, FILM COATED ORAL DAILY
Status: DISCONTINUED | OUTPATIENT
Start: 2020-01-01 | End: 2020-01-01 | Stop reason: HOSPADM

## 2020-01-01 RX ORDER — PHENYLEPHRINE HYDROCHLORIDE 10 MG/ML
INJECTION INTRAVENOUS PRN
Status: DISCONTINUED | OUTPATIENT
Start: 2020-01-01 | End: 2020-01-01 | Stop reason: SDUPTHER

## 2020-01-01 RX ORDER — CETIRIZINE HYDROCHLORIDE 10 MG/1
5 TABLET ORAL DAILY
Status: DISCONTINUED | OUTPATIENT
Start: 2020-01-01 | End: 2020-01-01 | Stop reason: HOSPADM

## 2020-01-01 RX ORDER — OXYCODONE HYDROCHLORIDE AND ACETAMINOPHEN 5; 325 MG/1; MG/1
1 TABLET ORAL EVERY 6 HOURS PRN
Qty: 28 TABLET | Refills: 0 | Status: SHIPPED | OUTPATIENT
Start: 2020-01-01 | End: 2020-01-01

## 2020-01-01 RX ORDER — 0.9 % SODIUM CHLORIDE 0.9 %
1000 INTRAVENOUS SOLUTION INTRAVENOUS ONCE
Status: COMPLETED | OUTPATIENT
Start: 2020-01-01 | End: 2020-01-01

## 2020-01-01 RX ORDER — LOSARTAN POTASSIUM 25 MG/1
25 TABLET ORAL EVERY EVENING
COMMUNITY

## 2020-01-01 RX ORDER — LOSARTAN POTASSIUM 25 MG/1
25 TABLET ORAL EVERY EVENING
Status: DISCONTINUED | OUTPATIENT
Start: 2020-01-01 | End: 2020-01-01 | Stop reason: HOSPADM

## 2020-01-01 RX ORDER — HEPARIN SODIUM 1000 [USP'U]/ML
INJECTION, SOLUTION INTRAVENOUS; SUBCUTANEOUS PRN
Status: DISCONTINUED | OUTPATIENT
Start: 2020-01-01 | End: 2020-01-01 | Stop reason: SDUPTHER

## 2020-01-01 RX ORDER — NICOTINE POLACRILEX 4 MG
15 LOZENGE BUCCAL PRN
Status: DISCONTINUED | OUTPATIENT
Start: 2020-01-01 | End: 2020-01-01 | Stop reason: HOSPADM

## 2020-01-01 RX ORDER — LORATADINE 10 MG/1
10 TABLET ORAL DAILY
COMMUNITY

## 2020-01-01 RX ORDER — FEBUXOSTAT 40 MG/1
40 TABLET, FILM COATED ORAL DAILY
COMMUNITY
End: 2021-01-01 | Stop reason: ALTCHOICE

## 2020-01-01 RX ORDER — ONDANSETRON 2 MG/ML
4 INJECTION INTRAMUSCULAR; INTRAVENOUS
Status: DISCONTINUED | OUTPATIENT
Start: 2020-01-01 | End: 2020-01-01 | Stop reason: HOSPADM

## 2020-01-01 RX ORDER — LIDOCAINE HYDROCHLORIDE 10 MG/ML
2 INJECTION, SOLUTION INFILTRATION; PERINEURAL ONCE
Status: DISCONTINUED | OUTPATIENT
Start: 2020-01-01 | End: 2020-01-01 | Stop reason: HOSPADM

## 2020-01-01 RX ORDER — SODIUM CHLORIDE 9 MG/ML
INJECTION, SOLUTION INTRAVENOUS CONTINUOUS
Status: DISCONTINUED | OUTPATIENT
Start: 2020-01-01 | End: 2020-01-01 | Stop reason: HOSPADM

## 2020-01-01 RX ORDER — PROMETHAZINE HYDROCHLORIDE 25 MG/ML
6.25 INJECTION, SOLUTION INTRAMUSCULAR; INTRAVENOUS
Status: DISCONTINUED | OUTPATIENT
Start: 2020-01-01 | End: 2020-01-01 | Stop reason: HOSPADM

## 2020-01-01 RX ORDER — FENTANYL CITRATE 50 UG/ML
INJECTION, SOLUTION INTRAMUSCULAR; INTRAVENOUS PRN
Status: DISCONTINUED | OUTPATIENT
Start: 2020-01-01 | End: 2020-01-01 | Stop reason: SDUPTHER

## 2020-01-01 RX ORDER — PRAVASTATIN SODIUM 20 MG
80 TABLET ORAL DAILY
Status: DISCONTINUED | OUTPATIENT
Start: 2020-01-01 | End: 2020-01-01 | Stop reason: HOSPADM

## 2020-01-01 RX ORDER — OXYCODONE HYDROCHLORIDE AND ACETAMINOPHEN 5; 325 MG/1; MG/1
1 TABLET ORAL EVERY 6 HOURS PRN
Status: DISCONTINUED | OUTPATIENT
Start: 2020-01-01 | End: 2020-01-01 | Stop reason: HOSPADM

## 2020-01-01 RX ORDER — DEXTROSE MONOHYDRATE 50 MG/ML
100 INJECTION, SOLUTION INTRAVENOUS PRN
Status: DISCONTINUED | OUTPATIENT
Start: 2020-01-01 | End: 2020-01-01 | Stop reason: HOSPADM

## 2020-01-01 RX ORDER — ROPIVACAINE HYDROCHLORIDE 5 MG/ML
30 INJECTION, SOLUTION EPIDURAL; INFILTRATION; PERINEURAL
Status: COMPLETED | OUTPATIENT
Start: 2020-01-01 | End: 2020-01-01

## 2020-01-01 RX ORDER — DEXAMETHASONE SODIUM PHOSPHATE 10 MG/ML
4 INJECTION, SOLUTION INTRAMUSCULAR; INTRAVENOUS ONCE
Status: COMPLETED | OUTPATIENT
Start: 2020-01-01 | End: 2020-01-01

## 2020-01-01 RX ORDER — DEXTROSE MONOHYDRATE 25 G/50ML
12.5 INJECTION, SOLUTION INTRAVENOUS PRN
Status: DISCONTINUED | OUTPATIENT
Start: 2020-01-01 | End: 2020-01-01 | Stop reason: HOSPADM

## 2020-01-01 RX ORDER — HYDRALAZINE HYDROCHLORIDE 25 MG/1
25 TABLET, FILM COATED ORAL 3 TIMES DAILY
Status: DISCONTINUED | OUTPATIENT
Start: 2020-01-01 | End: 2020-01-01 | Stop reason: HOSPADM

## 2020-01-01 RX ORDER — ALBUTEROL SULFATE 90 UG/1
AEROSOL, METERED RESPIRATORY (INHALATION)
COMMUNITY
End: 2020-01-01

## 2020-01-01 RX ORDER — PRAVASTATIN SODIUM 80 MG/1
80 TABLET ORAL DAILY
COMMUNITY

## 2020-01-01 RX ORDER — MIDAZOLAM HYDROCHLORIDE 2 MG/2ML
2 INJECTION, SOLUTION INTRAMUSCULAR; INTRAVENOUS ONCE
Status: COMPLETED | OUTPATIENT
Start: 2020-01-01 | End: 2020-01-01

## 2020-01-01 RX ORDER — SODIUM CHLORIDE 0.9 % (FLUSH) 0.9 %
10 SYRINGE (ML) INJECTION
Status: COMPLETED | OUTPATIENT
Start: 2020-01-01 | End: 2020-01-01

## 2020-01-01 RX ORDER — INSULIN GLARGINE 100 [IU]/ML
35 INJECTION, SOLUTION SUBCUTANEOUS NIGHTLY
Status: DISCONTINUED | OUTPATIENT
Start: 2020-01-01 | End: 2020-01-01 | Stop reason: HOSPADM

## 2020-01-01 RX ORDER — SODIUM BICARBONATE 650 MG/1
1300 TABLET ORAL 2 TIMES DAILY
Status: DISCONTINUED | OUTPATIENT
Start: 2020-01-01 | End: 2020-01-01 | Stop reason: HOSPADM

## 2020-01-01 RX ORDER — ISOSORBIDE MONONITRATE 30 MG/1
60 TABLET, EXTENDED RELEASE ORAL DAILY
Status: DISCONTINUED | OUTPATIENT
Start: 2020-01-01 | End: 2020-01-01 | Stop reason: HOSPADM

## 2020-01-01 RX ORDER — PROPOFOL 10 MG/ML
INJECTION, EMULSION INTRAVENOUS CONTINUOUS PRN
Status: DISCONTINUED | OUTPATIENT
Start: 2020-01-01 | End: 2020-01-01 | Stop reason: SDUPTHER

## 2020-01-01 RX ORDER — SODIUM CHLORIDE 0.9 % (FLUSH) 0.9 %
10 SYRINGE (ML) INJECTION EVERY 12 HOURS SCHEDULED
Status: DISCONTINUED | OUTPATIENT
Start: 2020-01-01 | End: 2020-01-01 | Stop reason: HOSPADM

## 2020-01-01 RX ORDER — FUROSEMIDE 20 MG/1
40 TABLET ORAL DAILY
Status: DISCONTINUED | OUTPATIENT
Start: 2020-01-01 | End: 2020-01-01 | Stop reason: HOSPADM

## 2020-01-01 RX ORDER — SODIUM CHLORIDE 0.9 % (FLUSH) 0.9 %
10 SYRINGE (ML) INJECTION PRN
Status: DISCONTINUED | OUTPATIENT
Start: 2020-01-01 | End: 2020-01-01 | Stop reason: HOSPADM

## 2020-01-01 RX ADMIN — LOSARTAN POTASSIUM 25 MG: 25 TABLET, FILM COATED ORAL at 19:38

## 2020-01-01 RX ADMIN — FUROSEMIDE 40 MG: 20 TABLET ORAL at 08:29

## 2020-01-01 RX ADMIN — ISOSORBIDE MONONITRATE 60 MG: 30 TABLET ORAL at 08:29

## 2020-01-01 RX ADMIN — HYDRALAZINE HYDROCHLORIDE 25 MG: 25 TABLET, FILM COATED ORAL at 20:26

## 2020-01-01 RX ADMIN — FENTANYL CITRATE 50 MCG: 50 INJECTION, SOLUTION INTRAMUSCULAR; INTRAVENOUS at 13:18

## 2020-01-01 RX ADMIN — APIXABAN 5 MG: 5 TABLET, FILM COATED ORAL at 08:29

## 2020-01-01 RX ADMIN — PROPOFOL 50 MCG/KG/MIN: 10 INJECTION, EMULSION INTRAVENOUS at 13:18

## 2020-01-01 RX ADMIN — PRAVASTATIN SODIUM 80 MG: 20 TABLET ORAL at 09:14

## 2020-01-01 RX ADMIN — APIXABAN 5 MG: 5 TABLET, FILM COATED ORAL at 20:26

## 2020-01-01 RX ADMIN — SODIUM BICARBONATE 1300 MG: 650 TABLET ORAL at 09:14

## 2020-01-01 RX ADMIN — ISOSORBIDE MONONITRATE 60 MG: 30 TABLET ORAL at 09:14

## 2020-01-01 RX ADMIN — OXYCODONE AND ACETAMINOPHEN 1 TABLET: 5; 325 TABLET ORAL at 09:21

## 2020-01-01 RX ADMIN — HYDRALAZINE HYDROCHLORIDE 25 MG: 25 TABLET, FILM COATED ORAL at 09:14

## 2020-01-01 RX ADMIN — PHENYLEPHRINE HYDROCHLORIDE 100 MCG: 10 INJECTION INTRAVENOUS at 13:52

## 2020-01-01 RX ADMIN — HYDRALAZINE HYDROCHLORIDE 25 MG: 25 TABLET, FILM COATED ORAL at 14:52

## 2020-01-01 RX ADMIN — ROPIVACAINE HYDROCHLORIDE 30 ML: 5 INJECTION, SOLUTION EPIDURAL; INFILTRATION; PERINEURAL at 11:56

## 2020-01-01 RX ADMIN — SODIUM CHLORIDE: 9 INJECTION, SOLUTION INTRAVENOUS at 13:11

## 2020-01-01 RX ADMIN — SODIUM CHLORIDE 1000 ML: 9 INJECTION, SOLUTION INTRAVENOUS at 17:10

## 2020-01-01 RX ADMIN — DEXAMETHASONE SODIUM PHOSPHATE 4 MG: 10 INJECTION, SOLUTION INTRAMUSCULAR; INTRAVENOUS at 11:56

## 2020-01-01 RX ADMIN — INSULIN GLARGINE 35 UNITS: 100 INJECTION, SOLUTION SUBCUTANEOUS at 23:16

## 2020-01-01 RX ADMIN — MAGNESIUM GLUCONATE 500 MG ORAL TABLET 400 MG: 500 TABLET ORAL at 09:14

## 2020-01-01 RX ADMIN — HYDRALAZINE HYDROCHLORIDE 25 MG: 25 TABLET, FILM COATED ORAL at 23:18

## 2020-01-01 RX ADMIN — CETIRIZINE HYDROCHLORIDE 5 MG: 10 TABLET, FILM COATED ORAL at 08:30

## 2020-01-01 RX ADMIN — IOPAMIDOL 60 ML: 755 INJECTION, SOLUTION INTRAVENOUS at 10:19

## 2020-01-01 RX ADMIN — PRAVASTATIN SODIUM 80 MG: 20 TABLET ORAL at 08:30

## 2020-01-01 RX ADMIN — PHENYLEPHRINE HYDROCHLORIDE 100 MCG: 10 INJECTION INTRAVENOUS at 13:58

## 2020-01-01 RX ADMIN — HYDRALAZINE HYDROCHLORIDE 25 MG: 25 TABLET, FILM COATED ORAL at 08:29

## 2020-01-01 RX ADMIN — APIXABAN 5 MG: 5 TABLET, FILM COATED ORAL at 09:14

## 2020-01-01 RX ADMIN — FUROSEMIDE 40 MG: 20 TABLET ORAL at 09:14

## 2020-01-01 RX ADMIN — APIXABAN 5 MG: 5 TABLET, FILM COATED ORAL at 23:18

## 2020-01-01 RX ADMIN — INSULIN GLARGINE 35 UNITS: 100 INJECTION, SOLUTION SUBCUTANEOUS at 20:26

## 2020-01-01 RX ADMIN — SODIUM BICARBONATE 1300 MG: 650 TABLET ORAL at 20:26

## 2020-01-01 RX ADMIN — HEPARIN SODIUM 11000 UNITS: 1000 INJECTION INTRAVENOUS; SUBCUTANEOUS at 13:52

## 2020-01-01 RX ADMIN — HYDRALAZINE HYDROCHLORIDE 25 MG: 25 TABLET, FILM COATED ORAL at 15:38

## 2020-01-01 RX ADMIN — FUROSEMIDE 40 MG: 20 TABLET ORAL at 17:32

## 2020-01-01 RX ADMIN — LOSARTAN POTASSIUM 25 MG: 25 TABLET, FILM COATED ORAL at 17:15

## 2020-01-01 RX ADMIN — CETIRIZINE HYDROCHLORIDE 5 MG: 10 TABLET, FILM COATED ORAL at 09:14

## 2020-01-01 RX ADMIN — OXYCODONE AND ACETAMINOPHEN 1 TABLET: 5; 325 TABLET ORAL at 17:15

## 2020-01-01 RX ADMIN — CEFAZOLIN 3 G: 1 INJECTION, POWDER, FOR SOLUTION INTRAMUSCULAR; INTRAVENOUS; PARENTERAL at 13:20

## 2020-01-01 RX ADMIN — MAGNESIUM GLUCONATE 500 MG ORAL TABLET 400 MG: 500 TABLET ORAL at 08:30

## 2020-01-01 RX ADMIN — SODIUM BICARBONATE 1300 MG: 650 TABLET ORAL at 08:29

## 2020-01-01 RX ADMIN — Medication 10 ML: at 10:19

## 2020-01-01 RX ADMIN — MIDAZOLAM 2 MG: 1 INJECTION INTRAMUSCULAR; INTRAVENOUS at 11:50

## 2020-01-01 RX ADMIN — ISOSORBIDE MONONITRATE 60 MG: 30 TABLET ORAL at 17:32

## 2020-01-01 ASSESSMENT — ENCOUNTER SYMPTOMS
EYE REDNESS: 0
WHEEZING: 0
ABDOMINAL PAIN: 0
SHORTNESS OF BREATH: 0
DIARRHEA: 0
VOMITING: 0
EYE DISCHARGE: 0
SINUS PRESSURE: 0
EYE PAIN: 0
COUGH: 0
SHORTNESS OF BREATH: 0
ABDOMINAL DISTENTION: 0
BACK PAIN: 0
SORE THROAT: 0
SHORTNESS OF BREATH: 0
NAUSEA: 0
ABDOMINAL PAIN: 0

## 2020-01-01 ASSESSMENT — PAIN SCALES - GENERAL
PAINLEVEL_OUTOF10: 0
PAINLEVEL_OUTOF10: 0
PAINLEVEL_OUTOF10: 5
PAINLEVEL_OUTOF10: 0
PAINLEVEL_OUTOF10: 6
PAINLEVEL_OUTOF10: 0
PAINLEVEL_OUTOF10: 0
PAINLEVEL_OUTOF10: 8
PAINLEVEL_OUTOF10: 6
PAINLEVEL_OUTOF10: 0

## 2020-01-01 ASSESSMENT — PAIN - FUNCTIONAL ASSESSMENT: PAIN_FUNCTIONAL_ASSESSMENT: 0-10

## 2020-01-31 ENCOUNTER — HOSPITAL ENCOUNTER (OUTPATIENT)
Age: 65
Discharge: HOME | End: 2020-01-31
Payer: MEDICARE

## 2020-01-31 LAB
ALBUMIN SERPL-MCNC: 4 G/DL (ref 3.5–5.2)
ALP BLD-CCNC: 104 U/L (ref 35–104)
ALT SERPL-CCNC: 12 U/L (ref 0–32)
ANION GAP SERPL CALCULATED.3IONS-SCNC: 14 MMOL/L (ref 7–16)
AST SERPL-CCNC: 18 U/L (ref 0–31)
BACTERIA: ABNORMAL /HPF
BILIRUB SERPL-MCNC: 0.8 MG/DL (ref 0–1.2)
BILIRUBIN URINE: NEGATIVE
BLOOD, URINE: NEGATIVE
BUN BLDV-MCNC: 48 MG/DL (ref 8–23)
C-REACTIVE PROTEIN, HIGH SENSITIVITY: 4.7 MG/L (ref 0–3)
CALCIUM IONIZED: 1.29 MMOL/L (ref 1.15–1.33)
CALCIUM SERPL-MCNC: 9.4 MG/DL (ref 8.6–10.2)
CHLORIDE BLD-SCNC: 103 MMOL/L (ref 98–107)
CHOLESTEROL, TOTAL: 151 MG/DL (ref 0–199)
CLARITY: ABNORMAL
CO2: 26 MMOL/L (ref 22–29)
COLOR: YELLOW
CREAT SERPL-MCNC: 2.3 MG/DL (ref 0.5–1)
CREATININE URINE: 105 MG/DL (ref 29–226)
EPITHELIAL CELLS, UA: ABNORMAL /HPF
GFR AFRICAN AMERICAN: 26
GFR NON-AFRICAN AMERICAN: 21 ML/MIN/1.73
GLUCOSE BLD-MCNC: 94 MG/DL (ref 74–99)
GLUCOSE URINE: NEGATIVE MG/DL
HBA1C MFR BLD: 5.6 % (ref 4–5.6)
HCT VFR BLD CALC: 44.5 % (ref 34–48)
HDLC SERPL-MCNC: 46 MG/DL
HEMOGLOBIN: 13.6 G/DL (ref 11.5–15.5)
KETONES, URINE: NEGATIVE MG/DL
LDL CHOLESTEROL CALCULATED: 82 MG/DL (ref 0–99)
LEUKOCYTE ESTERASE, URINE: ABNORMAL
MAGNESIUM: 2.2 MG/DL (ref 1.6–2.6)
MCH RBC QN AUTO: 28.1 PG (ref 26–35)
MCHC RBC AUTO-ENTMCNC: 30.6 % (ref 32–34.5)
MCV RBC AUTO: 91.9 FL (ref 80–99.9)
MICROALBUMIN UR-MCNC: 1131.8 MG/L
MICROALBUMIN/CREAT UR-RTO: 1077.9 (ref 0–30)
NITRITE, URINE: NEGATIVE
PARATHYROID HORMONE INTACT: 169 PG/ML (ref 15–65)
PDW BLD-RTO: 14.3 FL (ref 11.5–15)
PH UA: 7 (ref 5–9)
PHOSPHORUS: 3.7 MG/DL (ref 2.5–4.5)
PLATELET # BLD: 157 E9/L (ref 130–450)
PMV BLD AUTO: 12 FL (ref 7–12)
POTASSIUM SERPL-SCNC: 5.3 MMOL/L (ref 3.5–5)
PROTEIN UA: 100 MG/DL
RBC # BLD: 4.84 E12/L (ref 3.5–5.5)
RBC UA: ABNORMAL /HPF (ref 0–2)
SODIUM BLD-SCNC: 143 MMOL/L (ref 132–146)
SPECIFIC GRAVITY UA: 1.02 (ref 1–1.03)
TOTAL PROTEIN: 7.4 G/DL (ref 6.4–8.3)
TRIGL SERPL-MCNC: 113 MG/DL (ref 0–149)
URIC ACID, SERUM: 10.2 MG/DL (ref 2.4–5.7)
UROBILINOGEN, URINE: 0.2 E.U./DL
VITAMIN D 25-HYDROXY: 25 NG/ML (ref 30–100)
VLDLC SERPL CALC-MCNC: 23 MG/DL
WBC # BLD: 4.6 E9/L (ref 4.5–11.5)
WBC UA: ABNORMAL /HPF (ref 0–5)

## 2020-01-31 PROCEDURE — 82044 UR ALBUMIN SEMIQUANTITATIVE: CPT

## 2020-01-31 PROCEDURE — 86141 C-REACTIVE PROTEIN HS: CPT

## 2020-01-31 PROCEDURE — 85027 COMPLETE CBC AUTOMATED: CPT

## 2020-01-31 PROCEDURE — 83970 ASSAY OF PARATHORMONE: CPT

## 2020-01-31 PROCEDURE — 82570 ASSAY OF URINE CREATININE: CPT

## 2020-01-31 PROCEDURE — 83735 ASSAY OF MAGNESIUM: CPT

## 2020-01-31 PROCEDURE — 81001 URINALYSIS AUTO W/SCOPE: CPT

## 2020-01-31 PROCEDURE — 84550 ASSAY OF BLOOD/URIC ACID: CPT

## 2020-01-31 PROCEDURE — 80053 COMPREHEN METABOLIC PANEL: CPT

## 2020-01-31 PROCEDURE — 80061 LIPID PANEL: CPT

## 2020-01-31 PROCEDURE — 83036 HEMOGLOBIN GLYCOSYLATED A1C: CPT

## 2020-01-31 PROCEDURE — 82330 ASSAY OF CALCIUM: CPT

## 2020-01-31 PROCEDURE — 84100 ASSAY OF PHOSPHORUS: CPT

## 2020-01-31 PROCEDURE — 82306 VITAMIN D 25 HYDROXY: CPT

## 2020-01-31 PROCEDURE — 36415 COLL VENOUS BLD VENIPUNCTURE: CPT

## 2020-03-18 ENCOUNTER — OFFICE VISIT (OUTPATIENT)
Dept: VASCULAR SURGERY | Age: 65
End: 2020-03-18
Payer: MEDICARE

## 2020-03-18 VITALS — HEIGHT: 64 IN | RESPIRATION RATE: 16 BRPM | BODY MASS INDEX: 42.68 KG/M2 | WEIGHT: 250 LBS

## 2020-03-18 PROCEDURE — 99212 OFFICE O/P EST SF 10 MIN: CPT | Performed by: SURGERY

## 2020-03-18 NOTE — PROGRESS NOTES
FISTULA CREATION  LEFT ARM performed by Fred Mario MD at [address] Left 8/9/2019    AV FISTULA REVISION -- LEFT ARM performed by Fred Mario MD at [address], COLON, DIAGNOSTIC      colonoscopy    EYE SURGERY      cataract bilateral    [address]    same time as C section, ? left ovary    TUBAL LIGATION  1987    same time as C section     Current Medications:   Prior to Admission medications    Medication Sig Start Date End Date Taking? Authorizing Provider   metoprolol succinate (TOPROL XL) 25 MG extended release tablet Take 25 mg by mouth daily 12/6/19  Yes Historical Provider, MD   CPAP Machine MISC by Does not apply route   Yes Historical Provider, MD   cephALEXin (KEFLEX) 250 MG capsule Take 1 capsule by mouth 2 times daily 12/18/19  Yes Parminder Gutiérrez PA-C   omeprazole (PRILOSEC) 20 MG delayed release capsule TAKE 1 CAPSULE(S) EVERY 12 HOURS BY ORAL ROUTE FOR 14 DAYS. 8/31/19  Yes Historical Provider, MD   amiodarone (CORDARONE) 200 MG tablet Take 1 tablet by mouth daily 9/17/19  Yes Ari Bruno MD   apixaban (ELIQUIS) 5 MG TABS tablet Take 1 tablet by mouth 2 times daily Pt States ok to continue preop per Dr Lauree Peabody.  4/11/18  Yes Kelley Gonzalez MD   insulin glargine (LANTUS) 100 UNIT/ML injection vial Inject 35 Units into the skin nightly    Yes Historical Provider, MD   magnesium oxide (MAG-OX) 400 MG tablet Take 400 mg by mouth daily   Yes Historical Provider, MD   hydrALAZINE (APRESOLINE) 50 MG tablet Take 50 mg by mouth 3 times daily    Yes Historical Provider, MD   furosemide (LASIX) 40 MG tablet Take 40 mg by mouth daily    Yes Historical Provider, MD   isosorbide mononitrate (IMDUR) 60 MG CR tablet Take 60 mg by mouth daily    Yes Historical Provider, MD   sodium bicarbonate 650 MG tablet Take 1,300 mg by mouth 2 times daily   Yes Historical Provider, MD   vitamin B-12 (CYANOCOBALAMIN) 1000 MCG tablet Take 500 mcg by mouth three

## 2020-09-23 NOTE — PROGRESS NOTES
current or ex partner: Not on file     Emotionally abused: Not on file     Physically abused: Not on file     Forced sexual activity: Not on file   Other Topics Concern    Not on file   Social History Narrative    Not on file        Family History   Problem Relation Age of Onset    Heart Failure Mother     Cancer Mother         lung    Thyroid Disease Mother     Heart Failure Father     Diabetes Paternal Aunt     Diabetes Paternal Uncle        PHYSICAL EXAM:  Vitals:    09/23/20 0825   BP: 130/80   Resp: 16     General Appearance: alert and oriented to person, place and time, well developed and well- nourished, in no acute distress  Skin: warm and dry, no rash or erythema, the incision is healing nicely  Head: normocephalic and atraumatic  Eyes: extraocular eye movements intact, conjunctivae normal  ENT: external ear and ear canal normal bilaterally, nose without deformity  Pulmonary/Chest: clear to auscultation bilaterally- no wheezes, rales or rhonchi, normal air movement, no respiratory distress  Cardiovascular: normal rate, regular rhythm, normal S1 and S2, no murmurs, no carotid bruits  Neurologic: no cranial nerve deficit, gait, coordination and speech normal  Extremities: Radial pulse 2+ bilaterally. L brachial pulse 3+, ulnar 1+. Left arm RC AVF demonstrates a minimal thrill. Bruit is auscultated. Incision is currently healed. There is a focal narrowing just distal to the anastomosis on bedside US. Problem List Items Addressed This Visit        Vascular Problems    Encounter regarding vascular access for dialysis for ESRD ([facility] [address] - Primary        L RC AVF is patent with bruit auscultated. Focal area of stenosis noted. We will schedule her for a fistulogram. Procedure as well as risks, benefits, alternatives discussed and she would like to proceed. Pt seen and plan reviewed with Dr. Wilbert Woodard. Phil Georges PA-C    No follow-ups on file.

## 2020-10-12 NOTE — OP NOTE
Operative Note      Patient: Devan Adame  YOB: 1955  MRN: [id number]    Date of procedure: 10/12/2020    Preoperative diagnosis malfunctioning left arm arteriovenous fistula. This patient is currently not on hemodialysis    Postoperative diagnosis: A very long segment off the arterial anastomosis which is nearly occlusive. This then reconstitutes the antebrachial vein. The median cephalic and median basilic are widely patent. The basilic is widely patent. The cephalic is widely patent. The deep system brachial, axillary, subclavian and SVC are widely patent    Surgeon: Catalino Ayala MD    Assistant: None    Total amount of contrast approximately 10 cc    Anesthesia: None    Procedure:  #1 ultrasound-guided needle access of the left radiocephalic fistula  #2 left arm fistulogram with central venous imaging and reflux of the arterial system/arteriogram    Description of the procedure: After risk benefits and alternatives were discussed with the patient cleared not limited to bleeding, infection, arteriovenous nerve injury, myocardial infarction, respiratory distress, distal embolization, pacing of kidney disease and/or dialysis. Thrombophlebitis DVT limb loss entered as she understood and wished to proceed. The day the procedure she is brought to the Cath Lab and placed in the supine position. She was prepped and draped in standard sterile fashion. A timeout was taken to verify the person as well as the procedure. Ultrasound was used to identify the cephalic vein on the left side. This looks significantly decompressed. At this point lidocaine was used infiltrated skin and subcutaneous tissue. A micropuncture needle with ultrasound guidance was placed. Images were taken. The antebrachial vein was widely patent and nicely developed. The basilic and cephalic vein are nicely developed. No evidence of thrombus. Both superficial veins then fill into the axillary brachial junction. The subclavian is widely patent and the SVC is widely patent. We then reflux into the arterial system. There is a long segment several centimeters of near occlusive stenosis of about 99%. The artery looks unremarkable. At this point the micropuncture sheath was removed. Pressure was held. She will need scheduled for revision of a left arm arteriovenous access of the upper extremity.             Electronically signed by Myriam Beltran MD on 10/12/2020 at 8:19 AM

## 2020-11-04 NOTE — PROGRESS NOTES
[facility] Heart & Vascular Lab - [facility]    This is a pre read worksheet - prior to official physician interpretation    Christopher Marie  1955  Date of study: 11/4/20    Indication for study:  Chronic renal failure  Study : Bilateral Upper Extremity vein mapping    Diameter Veins (mm)     Left UE  Cephalic Vein:    Upper arm:   Proximal: 26 mm    Mid:  25 mm    Distal:  29 mm    Antecubital 45 mm    Forearm:   Proximal: 37 mm     Mid:   22 mm   Distal:  28 mm  Basilic Vein:    Upper arm:   Proximal: 56 mm   Mid:  53 mm   Distal:  37 mm    Forearm:   Proximal: 35 mm

## 2020-11-30 NOTE — PROGRESS NOTES
Vascular Surgery Outpatient Progress Note    HISTORY OF PRESENT ILLNESS:                The patient is a 59 y.o. female who returns for follow-up evaluation of patient with a history of a left RC arteriovenous fistula s/p elevation. Overall she is doing well she denies any arterial steal symptoms. She denies any issues regarding the incision. She denies any chest pain or shortness of breath other than some chronic SOB with exertion. Pt is still not on dialysis. She is status post fistulogram.  Unfortunately she has a long segment cephalic vein stenosis. At this point I do not think it is worth trying to salvage with a balloon angioplasty. I think we should just proceed with an upper extremity arteriovenous access creation at the more proximal segment of the antecubital crease. She otherwise is doing well. All questions were answered according to her satisfaction. She has no pain or discomfort she has no hand pain or discomfort she has no arm swelling. Past Medical History:        Diagnosis Date    A-fib [facility]) 03/2014    pt states diagnosed incidently during tx for uri, Dr Camden Helms PCP treating, pt to see Dr Christy Stephens first vs 04/28/2014.     Anemia     resolved states pt    Anemia associated with chronic renal failure     Arthritis     Cataract     CHF (congestive heart failure) (HCC)     Chronic kidney disease, stage IV (severe) (HCC)     Diabetes mellitus (HCC)     PRETTY GOOD PER PT    Hyperlipidemia     Hypertension     FINE PER PT    IBS (irritable bowel syndrome)     Obesity     Osteoarthritis     knees, right hand and left shoulder    Right cataract     Stomach cancer ([address]     Tendonitis of foot     left    Vitamin D deficiency     resolved vit supplement per pt     Past Surgical History:        Procedure Laterality Date    APPENDECTOMY  1976    same time as cholecystectomy    CARDIOVERSION  09/17/2019    DCCV to NSR    CARDIOVERSION  10/29/2019    Successful CV to NSR (Dr. Dwight Zhao)    CATARACT REMOVAL WITH IMPLANT Left 2014    CATARACT REMOVAL WITH IMPLANT  14     SECTION  1987    [facility]   [id number] [facility]    COLONOSCOPY  2012    diarrhea, irritable bowel syndrome, anal polyp biopsied, Dr. Jessi Aggarwal, [address] Left 2019    AV FISTULA CREATION  LEFT ARM performed by Shyann Lu MD at [address] Left 2019    AV FISTULA REVISION -- LEFT ARM performed by Shyann Lu MD at [address], COLON, DIAGNOSTIC      colonoscopy    EYE SURGERY      cataract bilateral    [address]    same time as C section, ? left ovary    TUBAL LIGATION      same time as C section     Current Medications:   Prior to Admission medications    Medication Sig Start Date End Date Taking? Authorizing Provider   febuxostat (ULORIC) 40 MG TABS tablet Take 40 mg by mouth daily   Yes Historical Provider, MD   losartan (COZAAR) 25 MG tablet Take 25 mg by mouth daily   Yes Historical Provider, MD   CPAP Machine MISC by Does not apply route   Yes Historical Provider, MD   apixaban (ELIQUIS) 5 MG TABS tablet Take 1 tablet by mouth 2 times daily Pt States ok to continue preop per Dr Jairo Nevarez.  18  Yes Raheem Dent MD   insulin glargine (LANTUS) 100 UNIT/ML injection vial Inject 35 Units into the skin nightly    Yes Historical Provider, MD   magnesium oxide (MAG-OX) 400 MG tablet Take 400 mg by mouth daily   Yes Historical Provider, MD   hydrALAZINE (APRESOLINE) 50 MG tablet Take 50 mg by mouth 3 times daily    Yes Historical Provider, MD   furosemide (LASIX) 40 MG tablet Take 40 mg by mouth daily    Yes Historical Provider, MD   isosorbide mononitrate (IMDUR) 60 MG CR tablet Take 60 mg by mouth daily    Yes Historical Provider, MD   sodium bicarbonate 650 MG tablet Take 1,300 mg by mouth 2 times daily   Yes Historical Provider, MD   vitamin B-12 (CYANOCOBALAMIN) 1000 MCG tablet Take 500 mcg by mouth three times a week    Yes Historical Provider, MD   loratadine (CLARITIN) 10 MG tablet TAKE ONE TABLET BY MOUTH EVERY DAY AS NEEDED  Patient taking differently: Take 10 mg by mouth daily  7/31/13  Yes Deirdre Collado MD   pravastatin (PRAVACHOL) 40 MG tablet Take 1 tablet by mouth daily. Patient taking differently: Take 80 mg by mouth daily  4/3/13  Yes Natalie Vicente MD   INSULIN SYRINGE .5CC/29G (ACCUSURE INS SYR .5CC/29GX1/2\") 29G X 1/2\" 0.5 ML MISC by Does not apply route. 12/6/12  Yes Kunal Hall MD   Coenzyme Q-10 100 MG CAPS Take 1 capsule by mouth daily. 12/6/12  Yes Kunal Hall MD   Glucose Blood (BLOOD GLUCOSE TEST STRIPS) STRP 1 each by In Vitro route daily. OK to switch per insurance coverage. 12/6/12  Yes Kunal Hall MD   Lancets MISC 1 each by Does not apply route. OK to change per insurance coverage. 12/6/12  Yes Kunal Hall MD     Allergies:  Latex; Adhesive tape; Aldactone [spironolactone]; Pcn [penicillins];  Terazosin hcl; and Naproxen    Social History     Socioeconomic History    Marital status:      Spouse name: Not on file    Number of children: Not on file    Years of education: Not on file    Highest education level: Not on file   Occupational History    Not on file   Social Needs    Financial resource strain: Not on file    Food insecurity     Worry: Not on file     Inability: Not on file    Transportation needs     Medical: Not on file     Non-medical: Not on file   Tobacco Use    Smoking status: Never Smoker    Smokeless tobacco: Never Used   Substance and Sexual Activity    Alcohol use: No    Drug use: No    Sexual activity: Not on file   Lifestyle    Physical activity     Days per week: Not on file     Minutes per session: Not on file    Stress: Not on file   Relationships    Social connections     Talks on phone: Not on file     Gets together: Not on file     Attends Nondenominational service: Not on file     Active member of club or organization: Not on file     Attends meetings of clubs or organizations: Not on file     Relationship status: Not on file    Intimate partner violence     Fear of current or ex partner: Not on file     Emotionally abused: Not on file     Physically abused: Not on file     Forced sexual activity: Not on file   Other Topics Concern    Not on file   Social History Narrative    Not on file        Family History   Problem Relation Age of Onset    Heart Failure Mother     Cancer Mother         lung    Thyroid Disease Mother     Heart Failure Father     Diabetes Paternal Aunt     Diabetes Paternal Uncle        PHYSICAL EXAM:  Vitals:    11/04/20 1002   BP: 122/70   Resp: 16     General Appearance: alert and oriented to person, place and time, well developed and well- nourished, in no acute distress  Skin: warm and dry, no rash or erythema, the incision is healing nicely  Head: normocephalic and atraumatic  Eyes: extraocular eye movements intact, conjunctivae normal  ENT: external ear and ear canal normal bilaterally, nose without deformity  Pulmonary/Chest: clear to auscultation bilaterally- no wheezes, rales or rhonchi, normal air movement, no respiratory distress  Cardiovascular: normal rate, regular rhythm, normal S1 and S2, no murmurs, no carotid bruits  Neurologic: no cranial nerve deficit, gait, coordination and speech normal  Extremities: Radial pulse 2+ bilaterally. L brachial pulse 3+, ulnar 1+. Left arm RC AVF demonstrates a minimal thrill. Bedside ultrasound demonstrated adequate cephalic and basilic vein. The cephalic vein is tortuous.     Humberto Lopes  1955  Date of study: 11/4/20     Indication for study:  Chronic renal failure  Study : Bilateral Upper Extremity vein mapping     Diameter Veins (mm)                Left UE  Cephalic Vein:    Upper arm:              Proximal:         26 mm               Mid:                 25 mm Distal:              29 mm               Antecubital      45 mm    Forearm:              Proximal:         37 mm                Mid:                 22 mm              Distal:              28 mm  Basilic Vein:    Upper arm:              Proximal:         56 mm              Mid:                 53 mm              Distal:              37 mm    Forearm:              Proximal:         35 mm                      Problem List Items Addressed This Visit     Encounter regarding vascular access for dialysis for ESRD [facility]) - Primary        Patient status post fistulogram.  She had a long segment stenosis. At this point were planning on revision. We will plan on a more proximal arteriovenous access creation. Her cephalic vein is adequate. However there was some tortuosity. Her basilic vein is also adequate. So she is definitely a good candidate for a brachiobasilic transposition. Risk benefits and alternatives were discussed with the patient including but not limited to bleeding, infection, arterial venous nerve injury, arterial steal, venous hypertension, sensorimotor disturbance or loss, maintenance of the access, need for additional access, loss of the access, if an AV graft needs to be used risk of infection and/or removal, distal embolization, wound complications, limb loss and/or death the patient understands and wishes to proceed. Alicia Guzman PA-C    No follow-ups on file.

## 2020-12-10 NOTE — H&P
Vascular Surgery History & Physical Exam      Chief Complaint: CRF    HISTORY OF PRESENT ILLNESS:                The patient is a 72 y.o. female who presents to the hospital for elective creation of a arteriovenous access. The patient denies any problems since the last office visit. IMPRESSION:   Active Hospital Problems    Diagnosis    Encounter regarding vascular access for dialysis for ESRD ([facility] [N18.6, Z99.2]       PLAN:  Creation of a left arm arteriovenous access. I reviewed the procedure with the patient. I discussed the risks, benefits, and alternatives of the procedure. The patient understands and consents. All questions were answered. Patient Active Problem List   Diagnosis Code    HTN, goal below 130/80 I10    DM (diabetes mellitus) with complications ([facility] F03.3    Morbid obesity ([address] E66.01    Dyslipidemia, goal LDL below 100 E78.5    Seasonal allergies J30.2    Osteoarthritis M19.90    Irritable bowel syndrome K58.9    Hypertensive chronic kidney disease I12.9    Other specified abnormal findings of blood chemistry R79.89    Stage 4 chronic kidney disease (HCC) N18.4    Atrial flutter (HCC) I48.92    Atrial fibrillation (HCC) I48.91    Arteriovenous fistula stenosis ([facility] H10.207C    Encounter regarding vascular access for dialysis for ESRD ([facility] N18.6, Z99.2       Past Medical History:   Diagnosis Date    A-fib ([facility] 03/2014    pt states diagnosed incidently during tx for uri, Dr Power Fagan PCP treating, pt to see Dr Odilon farrell vs 04/28/2014.     Anemia associated with chronic renal failure     Arthritis     Atrial fibrillation (HCC)     CHF (congestive heart failure) (HCC)     Chronic kidney disease, stage IV (severe) (HCC)     Diabetes mellitus (HCC)     Hyperlipidemia     Hypertension     FINE PER PT    IBS (irritable bowel syndrome)     Obesity     Osteoarthritis     knees, right hand and left shoulder    Stomach cancer (HCC)     Tendonitis of foot left    Vitamin D deficiency     resolved vit supplement per pt        Past Surgical History:   Procedure Laterality Date    APPENDECTOMY  1976    same time as cholecystectomy    CARDIOVERSION  09/17/2019    DCCV to NSR    CARDIOVERSION  10/29/2019    Successful CV to NSR   (Dr. Albertina Mccord)   [address]    COLONOSCOPY  11/27/2012    diarrhea, irritable bowel syndrome, anal polyp biopsied, Dr. Branch, [address] Left 6/13/2019    AV FISTULA CREATION  LEFT ARM performed by Jeremias Escalante MD at [address] [facility] Left 8/9/2019    AV FISTULA REVISION -- LEFT ARM performed by Jeremias Escalante MD at [address], COLON, DIAGNOSTIC      colonoscopy    EYE SURGERY  2014    cataract bilateral    [address]    same time as C section, ?  left ovary    TUBAL LIGATION  1987    same time as C section       Current Medications:     Current Facility-Administered Medications:     0.9 % sodium chloride infusion, , Intravenous, Continuous, LEENA Chaney CNP    sodium chloride flush 0.9 % injection 10 mL, 10 mL, Intravenous, 2 times per day, LEENA Chaney CNP    sodium chloride flush 0.9 % injection 10 mL, 10 mL, Intravenous, PRN, LEENA Chaney CNP    ceFAZolin (ANCEF) 3 g in dextrose 5 % 100 mL IVPB, 3 g, Intravenous, On Call to OR, LEENA Chaney CNP    HYDROmorphone (DILAUDID) injection 0.25 mg, 0.25 mg, Intravenous, Q5 Min PRN, Juan Mauro MD    HYDROmorphone (DILAUDID) injection 0.5 mg, 0.5 mg, Intravenous, Q5 Min PRN, Juan Mauro MD    ondansetron [facility]) injection 4 mg, 4 mg, Intravenous, Once PRN, Juan Mauro MD    promethazine (PHENERGAN) injection 6.25 mg, 6.25 mg, Intramuscular, Once PRN, Juan Mauro MD    lidocaine 1 % injection 2 mL, 2 mL, Intradermal, Once, Juan Mauro MD    Allergies:  Aldactone [spironolactone]; Pcn [penicillins]; Adhesive tape; Naproxen; and Terazosin hcl    Social History     Socioeconomic History    Marital status:      Spouse name: Not on file    Number of children: Not on file    Years of education: Not on file    Highest education level: Not on file   Occupational History    Not on file   Social Needs    Financial resource strain: Not on file    Food insecurity     Worry: Not on file     Inability: Not on file    Transportation needs     Medical: Not on file     Non-medical: Not on file   Tobacco Use    Smoking status: Never Smoker    Smokeless tobacco: Never Used   Substance and Sexual Activity    Alcohol use: No    Drug use: No    Sexual activity: Not on file   Lifestyle    Physical activity     Days per week: Not on file     Minutes per session: Not on file    Stress: Not on file   Relationships    Social connections     Talks on phone: Not on file     Gets together: Not on file     Attends [employer] service: Not on file     Active member of club or organization: Not on file     Attends meetings of clubs or organizations: Not on file     Relationship status: Not on file    Intimate partner violence     Fear of current or ex partner: Not on file     Emotionally abused: Not on file     Physically abused: Not on file     Forced sexual activity: Not on file   Other Topics Concern    Not on file   Social History Narrative    Not on file        Family History   Problem Relation Age of Onset    Heart Failure Mother     Cancer Mother         lung    Thyroid Disease Mother     Heart Failure Father     Diabetes Paternal Aunt     Diabetes Paternal Uncle        REVIEW OF SYSTEMS:  The chart was reviewed.     PHYSICAL EXAM:    Vitals:    12/10/20 1155   BP: (!) 166/79   Pulse: (!) 49   Resp: 25   Temp:    SpO2: 100%     CONSTITUTIONAL:  awake, alert, cooperative, no apparent distress  LUNGS:  no increased work of breathing, good air exchange  CARDIOVASCULAR:  regular rate and rhythm  ABDOMEN: soft, non-distended and non-tender    LABS:    Lab Results   Component Value Date    WBC 4.0 (L) 12/10/2020    HGB 12.0 12/10/2020    HCT 39.4 12/10/2020     12/10/2020    PROTIME 10.6 12/10/2020    INR 0.9 12/10/2020    K 4.5 12/10/2020    BUN 36 (H) 12/10/2020    CREATININE 2.1 (H) 12/10/2020       RADIOLOGY:

## 2020-12-10 NOTE — ANESTHESIA PRE PROCEDURE
albuterol sulfate HFA (VENTOLIN HFA) 108 (90 Base) MCG/ACT inhaler PRN    Historical Provider, MD   CPAP Machine MISC by Does not apply route    Historical Provider, MD   INSULIN SYRINGE .5CC/29G (ACCUSURE INS SYR .5CC/29GX1/2\") 29G X 1/2\" 0.5 ML MISC by Does not apply route. 12/6/12   Meño Mary MD   Glucose Blood (BLOOD GLUCOSE TEST STRIPS) STRP 1 each by In Vitro route daily. OK to switch per insurance coverage. 12/6/12   Meño Mary MD   Lancets MISC 1 each by Does not apply route. OK to change per insurance coverage. 12/6/12   Meño Mary MD       Current medications:    Current Facility-Administered Medications   Medication Dose Route Frequency Provider Last Rate Last Dose    0.9 % sodium chloride infusion   Intravenous Continuous LEENA Brown CNP        sodium chloride flush 0.9 % injection 10 mL  10 mL Intravenous 2 times per day LEENA Brown CNP        sodium chloride flush 0.9 % injection 10 mL  10 mL Intravenous PRN LEENA Brown CNP        ceFAZolin (ANCEF) 3 g in dextrose 5 % 100 mL IVPB  3 g Intravenous On Call to [address] [facility], APRN - CNP           Allergies:     Allergies   Allergen Reactions    Aldactone [Spironolactone] Swelling    Pcn [Penicillins] Hives    Adhesive Tape Other (See Comments)     Irritation of skin where applied    Naproxen Rash    Terazosin Hcl Itching       Problem List:    Patient Active Problem List   Diagnosis Code    HTN, goal below 130/80 I10    DM (diabetes mellitus) with complications ([facility] J80.4    Morbid obesity ([facility] E66.01    Dyslipidemia, goal LDL below 100 E78.5    Seasonal allergies J30.2    Osteoarthritis M19.90    Irritable bowel syndrome K58.9    Hypertensive chronic kidney disease I12.9    Other specified abnormal findings of blood chemistry R79.89    Stage 4 chronic kidney disease (HCC) N18.4    Atrial flutter (HCC) I48.92    Atrial fibrillation (HCC) I48.91    Arteriovenous fistula stenosis ([facility] S68.184F    Encounter regarding vascular access for dialysis for ESRD ([facility] N18.6, Z99.2       Past Medical History:        Diagnosis Date    A-fib ([facility] 03/2014    pt states diagnosed incidently during tx for uri, Dr Wes Jones PCP treating, pt to see Dr Lis Mckeon first vs 04/28/2014.  Anemia associated with chronic renal failure     Arthritis     CHF (congestive heart failure) (HCC)     Chronic kidney disease, stage IV (severe) (HCC)     Diabetes mellitus ([facility]     Hyperlipidemia     Hypertension     FINE PER PT    IBS (irritable bowel syndrome)     Obesity     Osteoarthritis     knees, right hand and left shoulder    Stomach cancer ([facility]     Tendonitis of foot     left    Vitamin D deficiency     resolved vit supplement per pt       Past Surgical History:        Procedure Laterality Date    APPENDECTOMY  1976    same time as cholecystectomy    CARDIOVERSION  09/17/2019    DCCV to NSR    CARDIOVERSION  10/29/2019    Successful CV to NSR   (Dr. Albertina Mccord)   [address]    COLONOSCOPY  11/27/2012    diarrhea, irritable bowel syndrome, anal polyp biopsied, Dr. Branch, [address] Left 6/13/2019    AV FISTULA CREATION  LEFT ARM performed by Jeremias Escalnate MD at [address] [place] [address] Left 8/9/2019    AV FISTULA REVISION -- LEFT ARM performed by Jeremias Escalante MD at [address] [place] [address] Rd, COLON, DIAGNOSTIC      colonoscopy    EYE SURGERY  2014    cataract bilateral    [address] [facility]    same time as C section, ? left ovary    TUBAL LIGATION  1987    same time as C section       Social History:    Social History     Tobacco Use    Smoking status: Never Smoker    Smokeless tobacco: Never Used   Substance Use Topics    Alcohol use:  No                                Counseling given: Not Answered      Vital Signs (Current):   Vitals:    12/02/20 0814 12/10/20 0922   BP:  (!) 191/78 Pulse:  50   Resp:  20   Temp:  97.2 °F (36.2 °C)   TempSrc:  Temporal   SpO2:  97%   Weight: 265 lb (120.2 kg) 260 lb (117.9 kg)   Height: 5' 4\" (1.626 m) 5' 4\" (1.626 m)                                              BP Readings from Last 3 Encounters:   12/10/20 (!) 191/78   11/04/20 122/70   09/23/20 130/80       NPO Status: Time of last liquid consumption: 1730                        Time of last solid consumption: 1730                        Date of last liquid consumption: 12/09/20                        Date of last solid food consumption: 12/09/20    BMI:   Wt Readings from Last 3 Encounters:   12/10/20 260 lb (117.9 kg)   11/04/20 260 lb (117.9 kg)   10/12/20 270 lb (122.5 kg)     Body mass index is 44.63 kg/m². CBC:   Lab Results   Component Value Date    WBC 4.6 01/31/2020    RBC 4.84 01/31/2020    HGB 13.6 01/31/2020    HCT 44.5 01/31/2020    MCV 91.9 01/31/2020    RDW 14.3 01/31/2020     01/31/2020       CMP:   Lab Results   Component Value Date     01/31/2020    K 5.3 01/31/2020    K 4.1 08/09/2019     01/31/2020    CO2 26 01/31/2020    BUN 48 01/31/2020    CREATININE 2.3 01/31/2020    GFRAA 26 01/31/2020    LABGLOM 21 01/31/2020    GLUCOSE 94 01/31/2020    GLUCOSE 223 04/16/2012    PROT 7.4 01/31/2020    CALCIUM 9.4 01/31/2020    BILITOT 0.8 01/31/2020    ALKPHOS 104 01/31/2020    AST 18 01/31/2020    ALT 12 01/31/2020       POC Tests: No results for input(s): POCGLU, POCNA, POCK, POCCL, POCBUN, POCHEMO, POCHCT in the last 72 hours.     Coags:   Lab Results   Component Value Date    PROTIME 15.4 09/17/2019    INR 1.4 09/17/2019       HCG (If Applicable): No results found for: PREGTESTUR, PREGSERUM, HCG, HCGQUANT     ABGs: No results found for: PHART, PO2ART, [id number], [id number], BEART, [id number]     Type & Screen (If Applicable):  No results found for: LABABO, LABRH    Drug/Infectious Status (If Applicable):  No results found for: HIV, HEPCAB    COVID-19 Screening (If Applicable): Lab Results   Component Value Date    COVID19 Not Detected 12/03/2020         Anesthesia Evaluation    Airway: Mallampati: III  TM distance: >3 FB   Neck ROM: full  Mouth opening: > = 3 FB Dental:    (+) edentulous      Pulmonary: breath sounds clear to auscultation                             Cardiovascular:    (+) hypertension:, dysrhythmias: atrial fibrillation and atrial flutter, CHF:,         Rhythm: regular                      Neuro/Psych:               GI/Hepatic/Renal:   (+) renal disease: ESRD,           Endo/Other:    (+) DiabetesType II DM, well controlled, , .                 Abdominal:           Vascular:                                    Anesthesia Plan      regional     ASA 4       Induction: intravenous. MIPS: Prophylactic antiemetics administered. Not on dialysis yet, accucheck today is 129. Agreed for nerve block with sedation.       Dorothy Leone MD   12/10/2020

## 2020-12-10 NOTE — OP NOTE
Operative Note      Patient: Mina Garcia  YOB: 1955  MRN: [id number]    DATE OF PROCEDURE: 12/10/2020     SURGEON: Nish Boudreaux M.D.     ASSISTANT: Dina Ji NP, no qualified general surgery resident was available for the case. She was scrubbed and present during the entire operation. PREOPERATIVE DIAGNOSIS: Chronic renal failure, Stage 4. POSTOPERATIVE DIAGNOSIS: Same    OPERATION: Creation of left brachiocephalic arteriovenous fistula     ANESTHESIA: Regional, local and [facility]    Findings: Cephalic vein appeared to be of good caliber. Was able to be easily dilated with a 4 Trell dilator. ESTIMATED BLOOD LOSS: Minimal     COMPLICATIONS: None    DESCRIPTION OF PROCEDURE: The patient was identified and the procedure was confirmed. The left arm was prepped and draped in the usual sterile fashion. Lidocaine 1% mixed with 0.25% Marcaine was used for local anesthesia. A transverse skin incision was made in the antecubital fossa and carried down through the subcutaneous tissue. The cephalic vein was identified and dissected free from the surrounding tissues. The vein appeared to be good quality for the fistula. It was marked for orientation and branches were divided between silk ties. It was ligated distally and divided. Medially, the brachial artery was identified and freed from the surrounding tissues. It was surrounded proximally and distally with vessel loops for control. The patient was heparinized and the artery was clamped proximally and distally. A longitudinal arteriotomy measuring 5 mm was made. The vein was cut to the appropriate length and spatulated and anastomosed to the side of the artery using a running 6-0 Prolene suture. After completing the anastomosis, the clamps were released and a thrill was appreciated through the fistula. A radial pulse was appreciated in the wrist. Hemostasis was obtained and the incisions were irrigated with saline solution.  The incision was approximated with Vicryl sutures and Dermabond was applied to the incisions in the operating room. Needle, sponge and instrument counts were reported as correct x2. The patient tolerated the procedure and was transferred to the recovery area in satisfactory condition.      Alicia Guzman    CC : Dorota Castano MD       Electronically signed by Alicia Guzman MD on 12/10/2020 at 4:00 PM

## 2020-12-10 NOTE — PROGRESS NOTES
Admitted to Same Day Surgery Unit. Preop instructions given to patient & family. Covid Test done: Yes, 12/3/20    Results: NEGATIVE    Self-quarantine guidelines followed since tested? Yes    Any unusual S/S or concerns expressed or observed?  No
Discharge instructions reviewed with patient. patient verbalized understanding,no questions regarding instructions.
Left arm is elevated on pillow.  Yajaira Hobbs
Left arm is positive for bruit and thrill.  Yajaira Hobbs
Patient agreed to COVID test on 12-3 at the  [facility]  located at  [address] [place] [address]. between the hours of 6 am- 2:30 pm, instructed to bring ID. Patient instructed to self isolate until day of surgery.
Security called to return belongings.  Yajaira Hobbs
machine if you are to spend the night in the hospital.     PARKING INSTRUCTIONS:   [x] Arrival Time:__________0930 via [address] door___  · [] Parking lot '\"I\"  is located on [facility] (the corner of [facility] and [facility]). To enter, press the button and the gate will lift. A free token will be provided to exit the lot. One car per patient is allowed to park in this lot. All other cars are to park on [address] [place] [address] Street either in the parking garage or the handicap lot. [] To reach the [facility] lobby from [address] [place] [address], upon entering the hospital, take elevator B to the 3rd floor. EDUCATION INSTRUCTIONS:      [] Knee or hip replacement booklet & exercise pamphlets given. [] [address] placed in chart. [] Pre-admission Testing educational folder given  [] Incentive Spirometry,coughing & deep breathing exercises reviewed. []Medication information sheet(s)   []Fluoroscopy-Xray used in surgery reviewed with patient. Educational pamphlet placed in chart. []Pain: Post-op pain is normal and to be expected. You will be asked to rate your pain from 0-10(a zero is not acceptable-education is needed). Your post-op pain goal is:  [] Ask your nurse for your pain medication. [] Joint camp offered. [] Joint replacement booklets given. [] Other:___________________________    MEDICATION INSTRUCTIONS:   [x]Bring a complete list of your medications, please write the last time you took the medicine, give this list to the nurse. [x] Take the following medications the morning of surgery with 1-2 ounces of water:   [x] Stop herbal supplements and vitamins 5 days before your surgery. [x] DO NOT take any diabetic medicine the morning of surgery. Follow instructions for insulin the day before surgery. [x] If you are diabetic and your blood sugar is low or you feel symptomatic, you may drink 1-2 ounces of apple juice or take a glucose tablet.   The morning of your procedure,

## 2020-12-10 NOTE — ANESTHESIA PROCEDURE NOTES
Peripheral Block    Patient location during procedure: procedure area  Staffing  Anesthesiologist: Pastor June MD  Performed: anesthesiologist   Preanesthetic Checklist  Completed: patient identified, site marked, surgical consent, pre-op evaluation, timeout performed, IV checked, risks and benefits discussed, monitors and equipment checked, anesthesia consent given, oxygen available and patient being monitored  Peripheral Block  Patient position: supine  Prep: alcohol swabs  Patient monitoring: cardiac monitor, continuous pulse ox, frequent blood pressure checks and IV access  Block type: Brachial plexus  Laterality: left  Injection technique: single-shot  Procedures: ultrasound guided  Local infiltration: ropivacaine  Infiltration strength: 0.5 %  Dose: 25 mL  Supraclavicular  Provider prep: mask and sterile gloves  Local infiltration: ropivacaine  Needle  Needle type: short-bevel   Needle gauge: 22 G  Needle localization: ultrasound guidance  Assessment  Injection assessment: negative aspiration for heme, no paresthesia on injection and local visualized surrounding nerve on ultrasound  Paresthesia pain: none  Slow fractionated injection: yes  Hemodynamics: stable  Additional Notes  Mixture of 0.5% Ropivacaine 25 mL and Decadron 10 mg injected in incremental doses of 5 mL after negative blood aspiration.   Reason for block: post-op pain management and at surgeon's request

## 2020-12-10 NOTE — H&P
Vascular Surgery History & Physical Exam      Chief Complaint: CRF    HISTORY OF PRESENT ILLNESS:                The patient is a 72 y.o. female who presents to the hospital for elective creation of a arteriovenous fistula. The patient denies any problems since the last office visit. She had a left arm radiocephalic fistula was placed. This was then followed by elevation secondary the patient's size. Unfortunately over time this had scarred down. A fistulogram was performed demonstrating a long segment stenosis. Therefore I did not think it was going to do well with standard balloon angioplasty and she is currently not on dialysis. Therefore we elected to move more proximal for arteriovenous fistula creation. IMPRESSION:   Active Hospital Problems    Diagnosis    Encounter regarding vascular access for dialysis for ESRD ([facility] [address] [N18.6, Z99.2]       PLAN:  Creation of a left  arteriovenous fistula. Risk benefits and alternatives were discussed with the patient including but not limited to bleeding, infection, arterial venous nerve injury, arterial steal, venous hypertension, sensorimotor disturbance or loss, maintenance of the access, need for additional access, loss of the access, if an AV graft needs to be used risk of infection and/or removal, distal embolization, wound complications, limb loss and/or death the patient understands and wishes to proceed. She understands secondary to her size at this may require an additional elevation. In particular if we need to use the basilic vein as the main outflow. This was discussed with the patient in the office.   As well as today prior to proceeding    Ita    Patient Active Problem List   Diagnosis Code    HTN, goal below 130/80 I10    DM (diabetes mellitus) with complications ([address] X60.7    Morbid obesity ([address] E66.01    Dyslipidemia, goal LDL below 100 E78.5    Seasonal allergies J30.2    Osteoarthritis M19.90    Irritable bowel syndrome K58.9    Hypertensive chronic kidney disease I12.9    Other specified abnormal findings of blood chemistry R79.89    Stage 4 chronic kidney disease (HCC) N18.4    Atrial flutter (HCC) I48.92    Atrial fibrillation (HCC) I48.91    Arteriovenous fistula stenosis ([facility] [address] R33.431G    Encounter regarding vascular access for dialysis for ESRD ([facility] [address] N18.6, Z99.2       Past Medical History:   Diagnosis Date    A-fib ([facility] [address] 03/2014    pt states diagnosed incidently during tx for uri, Dr Lisa Deal PCP treating, pt to see Dr Jose Estrella first vs 04/28/2014.  Anemia associated with chronic renal failure     Arthritis     Atrial fibrillation (HCC)     CHF (congestive heart failure) (HCC)     Chronic kidney disease, stage IV (severe) (HCC)     Diabetes mellitus ([facility] [address]     Hyperlipidemia     Hypertension     FINE PER PT    IBS (irritable bowel syndrome)     Obesity     Osteoarthritis     knees, right hand and left shoulder    Stomach cancer ([facility] [address]     Tendonitis of foot     left    Vitamin D deficiency     resolved vit supplement per pt        Past Surgical History:   Procedure Laterality Date    APPENDECTOMY  1976    same time as cholecystectomy    CARDIOVERSION  09/17/2019    DCCV to NSR    CARDIOVERSION  10/29/2019    Successful CV to NSR   (Dr. Salma Herrera)   Fredy Hinton COLONOSCOPY  11/27/2012    diarrhea, irritable bowel syndrome, anal polyp biopsied, Dr. Jade Rome, [address] Left 6/13/2019    AV FISTULA CREATION  LEFT ARM performed by Lu Garcia MD at [address] Left 8/9/2019    AV FISTULA REVISION -- LEFT ARM performed by Lu Garcia MD at [address], COLON, DIAGNOSTIC      colonoscopy    EYE SURGERY  2014    cataract bilateral    [address]    same time as C section, ?  left ovary    TUBAL LIGATION  1987    same time as C section       Current Medications:     Current Facility-Administered Medications:     0.9 % sodium chloride infusion, , Intravenous, Continuous, Susan Meyers Chuck, APRN - CNP    sodium chloride flush 0.9 % injection 10 mL, 10 mL, Intravenous, 2 times per day, Susan Meyers Chuck, APRN - CNP    sodium chloride flush 0.9 % injection 10 mL, 10 mL, Intravenous, PRN, Susan Meyers Chuck, APRN - CNP    ceFAZolin (ANCEF) 3 g in dextrose 5 % 100 mL IVPB, 3 g, Intravenous, On Call to OR, Susan Chirag, APRN - CNP    HYDROmorphone (DILAUDID) injection 0.25 mg, 0.25 mg, Intravenous, Q5 Min PRN, Lashawn Jane MD    HYDROmorphone (DILAUDID) injection 0.5 mg, 0.5 mg, Intravenous, Q5 Min PRN, Lashawn Jane MD    ondansetron [facility]) injection 4 mg, 4 mg, Intravenous, Once PRN, Lashawn Jane MD    promethazine (PHENERGAN) injection 6.25 mg, 6.25 mg, Intramuscular, Once PRN, Lashawn Jane MD    lidocaine 1 % injection 2 mL, 2 mL, Intradermal, Once, Lashawn Jane MD    Allergies:  Aldactone [spironolactone]; Pcn [penicillins];  Adhesive tape; Naproxen; and Terazosin hcl    Social History     Socioeconomic History    Marital status:      Spouse name: Not on file    Number of children: Not on file    Years of education: Not on file    Highest education level: Not on file   Occupational History    Not on file   Social Needs    Financial resource strain: Not on file    Food insecurity     Worry: Not on file     Inability: Not on file    Transportation needs     Medical: Not on file     Non-medical: Not on file   Tobacco Use    Smoking status: Never Smoker    Smokeless tobacco: Never Used   Substance and Sexual Activity    Alcohol use: No    Drug use: No    Sexual activity: Not on file   Lifestyle    Physical activity     Days per week: Not on file     Minutes per session: Not on file    Stress: Not on file   Relationships    Social connections     Talks on phone: Not on file     Gets together: Not on file     Attends [employer] service: Not on file     Active member of club or organization: Not on file     Attends meetings of clubs or organizations: Not on file     Relationship status: Not on file    Intimate partner violence     Fear of current or ex partner: Not on file     Emotionally abused: Not on file     Physically abused: Not on file     Forced sexual activity: Not on file   Other Topics Concern    Not on file   Social History Narrative    Not on file        Family History   Problem Relation Age of Onset    Heart Failure Mother     Cancer Mother         lung    Thyroid Disease Mother     Heart Failure Father     Diabetes Paternal Aunt     Diabetes Paternal Uncle        REVIEW OF SYSTEMS:  The chart was reviewed.     PHYSICAL EXAM:    Vitals:    12/10/20 1155   BP: (!) 166/79   Pulse: (!) 49   Resp: 25   Temp:    SpO2: 100%     CONSTITUTIONAL:  awake, alert, cooperative, no apparent distress, and appears stated age  NECK:  Supple, symmetrical, trachea midline, no adenopathy, thyroid symmetric, not enlarged and no tenderness, skin normal  LUNGS:  no increased work of breathing, good air exchange and clear to auscultation  CARDIOVASCULAR:  regular rate and rhythm and pulses 2 plus all extermities bilaterally  ABDOMEN:  soft, non-distended and non-tender    LABS:    Lab Results   Component Value Date    WBC 4.0 (L) 12/10/2020    HGB 12.0 12/10/2020    HCT 39.4 12/10/2020     12/10/2020    PROTIME 10.6 12/10/2020    INR 0.9 12/10/2020    K 4.5 12/10/2020    BUN 36 (H) 12/10/2020    CREATININE 2.1 (H) 12/10/2020       RADIOLOGY:

## 2020-12-11 NOTE — ANESTHESIA POSTPROCEDURE EVALUATION
Department of Anesthesiology  Postprocedure Note    Patient: Eliazar Sun  MRN: [id number]  YOB: 1955  Date of evaluation: 12/11/2020  Time:  8:31 AM     Procedure Summary     Date:  12/10/20 Room / Location:  [facility] 03 / [facility]    Anesthesia Start:  1311 Anesthesia Stop:  9760    Procedure:  AV FISTULA CREATION  LEFT ARM (Left Arm Upper) Diagnosis:  (CHRONIC RENAL FAILURE)    Surgeon:  Sandoval Bray MD Responsible Provider:  Bogdan Tillman MD    Anesthesia Type:  regional ASA Status:  4          Anesthesia Type: regional    Lupillo Phase I: Lupillo Score: 10    Lupillo Phase II: Lupillo Score: 10    Last vitals: Reviewed and per EMR flowsheets.        Anesthesia Post Evaluation    Patient location during evaluation: PACU  Patient participation: complete - patient participated  Level of consciousness: awake  Pain score: 0  Airway patency: patent  Nausea & Vomiting: no nausea  Complications: no  Cardiovascular status: hemodynamically stable  Respiratory status: acceptable  Hydration status: stable

## 2020-12-14 NOTE — TELEPHONE ENCOUNTER
Pt is s/p creation of (L) BC AVF on 12/10; c/o (R) arm/ear numbness and tingling since afternoon of 12/11.

## 2020-12-15 PROBLEM — M79.609 PARESTHESIA AND PAIN OF RIGHT EXTREMITY: Status: ACTIVE | Noted: 2020-01-01

## 2020-12-15 PROBLEM — R20.2 PARESTHESIA AND PAIN OF RIGHT EXTREMITY: Status: ACTIVE | Noted: 2020-01-01

## 2020-12-15 NOTE — ED NOTES
FIRST PROVIDER CONTACT ASSESSMENT NOTE      Department of Emergency Medicine   Admit Date: No admission date for patient encounter. Chief Complaint: Numbness (had nerve block (for fistula surgery) done on Thursday and then Firday night started to have right arm tingling. )      History of Present Illness:    Ofelia Carrel is a 72 y.o. female who presents to the ED for evaluation of numbness after having a nerve block for fistula surgery on December 10. Her nerve block was on the left and she started having right arm tingling on Friday. She contacted Dr. Gertrude Swanson today and was referred to the emergency department. She is alert and oriented, hand grasps, dorsiflexion and plantar flexion strong and equal bilaterally. Full sensation bilaterally. Clear speech and no facial asymmetry.   Ambulatory with a steady gait.        -----------------END OF FIRST PROVIDER CONTACT ASSESSMENT NOTE--------------  Electronically signed by LEENA Benson CNP   DD: 12/15/20               LEENA Benson CNP  12/15/20 [id number]

## 2020-12-15 NOTE — ED NOTES
Bed: 18A-18  Expected date:   Expected time:   Means of arrival:   Comments:  triage     Jen Goodwin RN  12/15/20 9954

## 2020-12-15 NOTE — ED NOTES
Radiology Procedure Waiver   Name: Tara Ames  : 1955  MRN: [id number]    Date:  12/15/20    Time: 10:09 AM EST    Benefits of immediately proceeding with Radiology exam(s) without pre-testing outweigh the risks or are not indicated as specified below and therefore the following is/are being waived:    [] Pregnancy test   [] Patients LMP on-time and regular.   [] Patient had Tubal Ligation or has other Contraception Device. [] Patient  is Menopausal or Premenarcheal.    [] Patient had Full or Partial Hysterectomy. [] Protocol for Iodine allergy    [] MRI Questionnaire     [x] BUN/Creatinine   [] Patient age w/no hx of renal dysfunction. [x] Patient on Dialysis. [] Recent Normal Labs.   Electronically signed by LEENA Pina CNP on 12/15/20 at 10:09 AM LEENA Ybarra CNP  12/15/20 1016

## 2020-12-15 NOTE — TELEPHONE ENCOUNTER
She told me her left side is fine.   She has tingling from her ear to her arm on the right side no motor or sensation loss I told her to go to the emergency room

## 2020-12-16 PROBLEM — I63.9 ACUTE ISCHEMIC STROKE (HCC): Status: ACTIVE | Noted: 2020-01-01

## 2020-12-16 NOTE — H&P
HISTORY AND PHYSICAL             Date: 12/16/2020        Patient Name: Dhara Chaves     YOB: 1955      Age:  72 y.o. Chief Complaint     Chief Complaint   Patient presents with    Numbness     had nerve block (for fistula surgery) done on Thursday and then Firday night started to have right arm tingling. History Obtained From   patient    History of Present Illness   Patient had nerve block last week when fistula done in her left arm. She says her right arm has been numb since then. Past Medical History     Past Medical History:   Diagnosis Date    A-fib [facility]) 03/2014    pt states diagnosed incidently during tx for uri, Dr Ozzie Mckeon PCP treating, pt to see Dr Tiffanie Lou first vs 04/28/2014.     Anemia associated with chronic renal failure     Arthritis     Atrial fibrillation (HCC)     CHF (congestive heart failure) (HCC)     Chronic kidney disease, stage IV (severe) (HCC)     Diabetes mellitus ([address]     Hyperlipidemia     Hypertension     FINE PER PT    IBS (irritable bowel syndrome)     Obesity     Osteoarthritis     knees, right hand and left shoulder    Stomach cancer ([address]     Tendonitis of foot     left    Vitamin D deficiency     resolved vit supplement per pt        Past Surgical History     Past Surgical History:   Procedure Laterality Date    APPENDECTOMY  1976    same time as cholecystectomy    CARDIOVERSION  09/17/2019    DCCV to NSR    CARDIOVERSION  10/29/2019    Successful CV to NSR   (Dr. Gaye Jackman)   [facility] COLONOSCOPY  11/27/2012    diarrhea, irritable bowel syndrome, anal polyp biopsied, Dr. Marylin Real, [facility]    DIALYSIS FISTULA CREATION Left 6/13/2019    AV FISTULA CREATION  LEFT ARM performed by Evan Durand MD at [address] Left 8/9/2019    AV FISTULA REVISION -- LEFT ARM performed by Evan Durand MD at [address] Left 12/10/2020 AV FISTULA CREATION  LEFT ARM performed by Nish Boudreaux MD at [facility] ENDOSCOPY, COLON, DIAGNOSTIC      colonoscopy    EYE SURGERY  2014    cataract bilateral    [address]    same time as C section, ? left ovary    TUBAL LIGATION  1987    same time as C section        Medications Prior to Admission     Prior to Admission medications    Medication Sig Start Date End Date Taking?  Authorizing Provider   apixaban (ELIQUIS) 5 MG TABS tablet Take 5 mg by mouth 2 times daily   Yes Historical Provider, MD   loratadine (CLARITIN) 10 MG tablet Take 10 mg by mouth daily   Yes Historical Provider, MD   pravastatin (PRAVACHOL) 80 MG tablet Take 80 mg by mouth daily   Yes Historical Provider, MD   febuxostat (ULORIC) 40 MG TABS tablet Take 40 mg by mouth daily   Yes Historical Provider, MD   losartan (COZAAR) 25 MG tablet Take 25 mg by mouth every evening    Yes Historical Provider, MD   insulin glargine (LANTUS) 100 UNIT/ML injection vial Inject 35 Units into the skin nightly    Yes Historical Provider, MD   magnesium oxide (MAG-OX) 400 MG tablet Take 400 mg by mouth daily   Yes Historical Provider, MD   hydrALAZINE (APRESOLINE) 25 MG tablet Take 25 mg by mouth 3 times daily    Yes Historical Provider, MD   furosemide (LASIX) 40 MG tablet Take 40 mg by mouth daily    Yes Historical Provider, MD   isosorbide mononitrate (IMDUR) 60 MG CR tablet Take 60 mg by mouth daily    Yes Historical Provider, MD   sodium bicarbonate 650 MG tablet Take 1,300 mg by mouth 2 times daily   Yes Historical Provider, MD        Allergies   Aldactone [spironolactone], Pcn [penicillins], Adhesive tape, Naproxen, and Terazosin hcl    Social History     Social History     Tobacco History     Smoking Status  Never Smoker    Smokeless Tobacco Use  Never Used          Alcohol History     Alcohol Use Status  No          Drug Use     Drug Use Status  No          Sexual Activity     Sexually Active  Not Asked Family History     Family History   Problem Relation Age of Onset    Heart Failure Mother     Cancer Mother         lung    Thyroid Disease Mother     Heart Failure Father     Diabetes Paternal Aunt     Diabetes Paternal Uncle        Review of Systems   Review of Systems   Constitutional: Positive for activity change. Respiratory: Negative for shortness of breath. Cardiovascular: Negative for chest pain. Gastrointestinal: Negative for abdominal pain. Musculoskeletal: Negative for gait problem. Neurological: Positive for numbness. Negative for weakness. Hematological: Negative for adenopathy. Psychiatric/Behavioral: Negative for agitation. Physical Exam   BP (!) 174/80   Pulse 64   Temp 98 °F (36.7 °C) (Temporal)   Resp 18   Ht 5' 4\" (1.626 m)   Wt 260 lb (117.9 kg)   SpO2 98%   BMI 44.63 kg/m²     Physical Exam  HENT:      Head: Normocephalic. Mouth/Throat:      Mouth: Mucous membranes are moist.   Eyes:      Pupils: Pupils are equal, round, and reactive to light. Cardiovascular:      Rate and Rhythm: Normal rate and regular rhythm. Pulses: Normal pulses. Heart sounds: Normal heart sounds. Pulmonary:      Effort: Pulmonary effort is normal. No respiratory distress. Breath sounds: Normal breath sounds. No wheezing. Abdominal:      General: Abdomen is flat. Bowel sounds are normal. There is no distension. Tenderness: There is no abdominal tenderness. Skin:     General: Skin is warm and dry. Capillary Refill: Capillary refill takes less than 2 seconds. Neurological:      General: No focal deficit present. Mental Status: She is alert.    Psychiatric:         Mood and Affect: Mood normal.         Behavior: Behavior normal.         Labs      Recent Results (from the past 24 hour(s))   EKG 12 Lead    Collection Time: 12/15/20  9:26 AM   Result Value Ref Range    Ventricular Rate 96 BPM    Atrial Rate 125 BPM    QRS Duration 148 ms Q-T Interval 446 ms    QTc Calculation (Bazett) 563 ms    R Axis 48 degrees    T Axis 16 degrees   CBC Auto Differential    Collection Time: 12/15/20  9:33 AM   Result Value Ref Range    WBC 4.1 (L) 4.5 - 11.5 E9/L    RBC 3.92 3.50 - 5.50 E12/L    Hemoglobin 11.3 (L) 11.5 - 15.5 g/dL    Hematocrit 36.0 34.0 - 48.0 %    MCV 91.8 80.0 - 99.9 fL    MCH 28.8 26.0 - 35.0 pg    MCHC 31.4 (L) 32.0 - 34.5 %    RDW 15.8 (H) 11.5 - 15.0 fL    Platelets 327 859 - 542 E9/L    MPV 11.5 7.0 - 12.0 fL    Neutrophils % 66.2 43.0 - 80.0 %    Immature Granulocytes % 0.2 0.0 - 5.0 %    Lymphocytes % 20.8 20.0 - 42.0 %    Monocytes % 8.0 2.0 - 12.0 %    Eosinophils % 4.3 0.0 - 6.0 %    Basophils % 0.5 0.0 - 2.0 %    Neutrophils Absolute 2.74 1.80 - 7.30 E9/L    Immature Granulocytes # 0.01 E9/L    Lymphocytes Absolute 0.86 (L) 1.50 - 4.00 E9/L    Monocytes Absolute 0.33 0.10 - 0.95 E9/L    Eosinophils Absolute 0.18 0.05 - 0.50 E9/L    Basophils Absolute 0.02 0.00 - 0.20 G0/I   Basic metabolic panel    Collection Time: 12/15/20  9:33 AM   Result Value Ref Range    Sodium 141 132 - 146 mmol/L    Potassium 4.3 3.5 - 5.0 mmol/L    Chloride 107 98 - 107 mmol/L    CO2 25 22 - 29 mmol/L    Anion Gap 9 7 - 16 mmol/L    Glucose 97 74 - 99 mg/dL    BUN 47 (H) 8 - 23 mg/dL    CREATININE 2.3 (H) 0.5 - 1.0 mg/dL    GFR Non-African American 21 >=60 mL/min/1.73    GFR African American 26     Calcium 8.7 8.6 - 10.2 mg/dL   Magnesium    Collection Time: 12/15/20  9:33 AM   Result Value Ref Range    Magnesium 2.2 1.6 - 2.6 mg/dL   Protime-INR    Collection Time: 12/15/20  9:33 AM   Result Value Ref Range    Protime 15.1 (H) 9.3 - 12.4 sec    INR 1.3    Troponin    Collection Time: 12/15/20  9:33 AM   Result Value Ref Range    Troponin <0.01 0.00 - 0.03 ng/mL   POCT Glucose    Collection Time: 12/15/20  4:42 PM   Result Value Ref Range    Meter Glucose 76 74 - 99 mg/dL   POCT Glucose    Collection Time: 12/15/20 11:15 PM   Result Value Ref Range Meter Glucose 113 (H) 74 - 99 mg/dL   CBC    Collection Time: 12/16/20  5:09 AM   Result Value Ref Range    WBC 5.1 4.5 - 11.5 E9/L    RBC 3.75 3.50 - 5.50 E12/L    Hemoglobin 10.6 (L) 11.5 - 15.5 g/dL    Hematocrit 35.0 34.0 - 48.0 %    MCV 93.3 80.0 - 99.9 fL    MCH 28.3 26.0 - 35.0 pg    MCHC 30.3 (L) 32.0 - 34.5 %    RDW 15.7 (H) 11.5 - 15.0 fL    Platelets 449 918 - 429 E9/L    MPV 11.7 7.0 - 12.0 fL   Basic metabolic panel    Collection Time: 12/16/20  5:09 AM   Result Value Ref Range    Sodium 142 132 - 146 mmol/L    Potassium 4.7 3.5 - 5.0 mmol/L    Chloride 111 (H) 98 - 107 mmol/L    CO2 22 22 - 29 mmol/L    Anion Gap 9 7 - 16 mmol/L    Glucose 86 74 - 99 mg/dL    BUN 40 (H) 8 - 23 mg/dL    CREATININE 2.1 (H) 0.5 - 1.0 mg/dL    GFR Non-African American 24 >=60 mL/min/1.73    GFR African American 29     Calcium 8.8 8.6 - 10.2 mg/dL        Imaging/Diagnostics Last 24 Hours   Ct Head Wo Contrast    Result Date: 12/15/2020 EXAMINATION: CT OF THE CERVICAL SPINE WITHOUT CONTRAST 12/15/2020 10:11 am TECHNIQUE: CT of the cervical spine was performed without the administration of intravenous contrast. Multiplanar reformatted images are provided for review. Dose modulation, iterative reconstruction, and/or weight based adjustment of the mA/kV was utilized to reduce the radiation dose to as low as reasonably achievable. COMPARISON: None. HISTORY: ORDERING SYSTEM PROVIDED HISTORY: right sided tingling, r/o degenerative disc disease TECHNOLOGIST PROVIDED HISTORY: Reason for exam:->right sided tingling, r/o degenerative disc disease What reading provider will be dictating this exam?->CRC FINDINGS: BONES/ALIGNMENT: The ring of C1 is intact as is the dense. There is no compression fracture of the cervical spine. No jumped or perched facet is noted. There is minimal degenerative disc disease at the C5-6 level. The prevertebral soft tissues are unremarkable. There is no soft tissue mass. 1. There is no acute fracture or subluxation of the cervical spine 2. Minimal degenerative disc disease at the C5-6 level.     Cta Neck W Contrast    Result Date: 12/15/2020 EXAMINATION: CTA OF THE HEAD WITH CONTRAST; CTA OF THE NECK 12/15/2020 10:11 am: TECHNIQUE: CTA of the head/brain was performed with the administration of intravenous contrast. Multiplanar reformatted images are provided for review. MIP images are provided for review. Dose modulation, iterative reconstruction, and/or weight based adjustment of the mA/kV was utilized to reduce the radiation dose to as low as reasonably achievable.; CTA of the neck was performed with the administration of intravenous contrast. Multiplanar reformatted images are provided for review. MIP images are provided for review. Stenosis of the internal carotid arteries measured using NASCET criteria. Dose modulation, iterative reconstruction, and/or weight based adjustment of the mA/kV was utilized to reduce the radiation dose to as low as reasonably achievable. COMPARISON: Noncontrast CT head from earlier today. HISTORY: ORDERING SYSTEM PROVIDED HISTORY: CVA TECHNOLOGIST PROVIDED HISTORY: Has a \"code stroke\" or \"stroke alert\" been called? ->Yes Reason for exam:->CVA What reading provider will be dictating this exam?->CRC FINDINGS: CTA NECK: AORTIC ARCH/ARCH VESSELS: No dissection or arterial injury. No significant stenosis of the brachiocephalic or subclavian arteries. CAROTID ARTERIES: No dissection, arterial injury, or hemodynamically significant stenosis by NASCET criteria. VERTEBRAL ARTERIES: No dissection, arterial injury, or significant stenosis. SOFT TISSUES: The lung apices are clear. There are calcified mediastinal and bilateral hilar lymph nodes, likely related to prior granulomatous disease. There is 2 mm lung nodule in the left upper lobe (series 6, image 45). There is 2.4 mm lung nodule in the right upper lobe (series 6, image 12). There is ground-glass attenuation at the lung apices, likely related to underdistention. There are nonenlarged mediastinal lymph nodes, likely reactive. No evidence of cervical lymphadenopathy. The larynx and pharynx are unremarkable. No acute abnormality of the salivary and thyroid glands. BONES: No acute osseous abnormality. CTA HEAD: ANTERIOR CIRCULATION: No significant stenosis of the intracranial internal carotid, anterior cerebral, or middle cerebral arteries. No aneurysm. POSTERIOR CIRCULATION: No significant stenosis of the vertebral, basilar, or posterior cerebral arteries. No aneurysm. OTHER: No dural venous sinus thrombosis on this non-dedicated study. BRAIN: No mass effect or midline shift. No extra-axial fluid collection. There are old lacunar infarcts in the bilateral thalami and left basal ganglia. No evidence of acute territorial infarction. No acute abnormality or flow-limiting stenosis of the major arteries of the head and neck. Lung nodules measuring up to 2.4 mm. Follow-up recommendation is listed below. RECOMMENDATIONS: Fleischner Society guidelines for follow-up and management of incidentally detected pulmonary nodules: Multiple Solid Nodules: Nodule size less than 6 mm In a low-risk patient, no routine follow-up. In a high-risk patient, optional CT at 12 months. - Low risk patients include individuals with minimal or absent history of smoking and other known risk factors. - High risk patients include individuals with a history or smoking or known risk factors. Radiology 2017 http://pubs. rsna.org/doi/full/10.1148/radiol. 4817135321    Cta Head W Contrast    Result Date: 12/15/2020 The larynx and pharynx are unremarkable. No acute abnormality of the salivary and thyroid glands. BONES: No acute osseous abnormality. CTA HEAD: ANTERIOR CIRCULATION: No significant stenosis of the intracranial internal carotid, anterior cerebral, or middle cerebral arteries. No aneurysm. POSTERIOR CIRCULATION: No significant stenosis of the vertebral, basilar, or posterior cerebral arteries. No aneurysm. OTHER: No dural venous sinus thrombosis on this non-dedicated study. BRAIN: No mass effect or midline shift. No extra-axial fluid collection. There are old lacunar infarcts in the bilateral thalami and left basal ganglia. No evidence of acute territorial infarction. No acute abnormality or flow-limiting stenosis of the major arteries of the head and neck. Lung nodules measuring up to 2.4 mm. Follow-up recommendation is listed below. RECOMMENDATIONS: Fleischner Society guidelines for follow-up and management of incidentally detected pulmonary nodules: Multiple Solid Nodules: Nodule size less than 6 mm In a low-risk patient, no routine follow-up. In a high-risk patient, optional CT at 12 months. - Low risk patients include individuals with minimal or absent history of smoking and other known risk factors. - High risk patients include individuals with a history or smoking or known risk factors. Radiology 2017 http://pubs. rsna.org/doi/full/10.1148/radiol. 6198255268      Assessment      Hospital Problems           Last Modified POA    Paresthesia and pain of right extremity 12/15/2020 Yes      Afib  Hyperlipidemia  HTN  DM    Plan   1. Neurology to see. No sign of CVA. Likely from anesthesia.     Consultations Ordered:  IP CONSULT TO ANESTHESIOLOGY  IP CONSULT TO INTERNAL MEDICINE  IP CONSULT TO NEUROLOGY    Electronically signed by Kelley Wright MD on 12/16/20 at 6:46 AM EST

## 2020-12-16 NOTE — CARE COORDINATION
Social Work Discharge Planning:  SW me with patient explained transition of care. Patient, son and grandson lives in a to story home. Patient's bed and bath are on the 2nd floor. Patient has no hx with C or [facility]. Patient has a CPAP machine. Patient's PCP is Dr.Ayan Thompson and Pharmacy is [facility] on [address]. Patient's family will transport the patient home at discharge. SW will continue to follow and assist as needed.   Electronically signed by Justina Dakins, LSW on 12/16/2020 at 10:36 AM

## 2020-12-16 NOTE — CONSULTS
Shane Vuong is a 72 y.o. female       Chief Complaint   Patient presents with    Numbness     had nerve block (for fistula surgery) done on Thursday and then Firday night started to have right arm tingling. HPI:  72year old woman with history of kidney failure, DM2 and afib on eliquis presents with sudden onset sensory symptoms of the right neck, ear arm and leg. Pt had a nerve block on the other arm a few days prior for fistula surgery. She denies any previous stroke like symptoms. CT head is read as no acute pathology. By my read there is a small are of hypodensity in the left thalamus that corresponds to her symptoms. Eliquis was held for procedure  HPI  Prior to Visit Medications    Medication Sig Taking?  Authorizing Provider   apixaban (ELIQUIS) 5 MG TABS tablet Take 5 mg by mouth 2 times daily Yes Historical Provider, MD   loratadine (CLARITIN) 10 MG tablet Take 10 mg by mouth daily Yes Historical Provider, MD   pravastatin (PRAVACHOL) 80 MG tablet Take 80 mg by mouth daily Yes Historical Provider, MD   febuxostat (ULORIC) 40 MG TABS tablet Take 40 mg by mouth daily Yes Historical Provider, MD   losartan (COZAAR) 25 MG tablet Take 25 mg by mouth every evening  Yes Historical Provider, MD   insulin glargine (LANTUS) 100 UNIT/ML injection vial Inject 35 Units into the skin nightly  Yes Historical Provider, MD   magnesium oxide (MAG-OX) 400 MG tablet Take 400 mg by mouth daily Yes Historical Provider, MD   hydrALAZINE (APRESOLINE) 25 MG tablet Take 25 mg by mouth 3 times daily  Yes Historical Provider, MD   furosemide (LASIX) 40 MG tablet Take 40 mg by mouth daily  Yes Historical Provider, MD   isosorbide mononitrate (IMDUR) 60 MG CR tablet Take 60 mg by mouth daily  Yes Historical Provider, MD   sodium bicarbonate 650 MG tablet Take 1,300 mg by mouth 2 times daily Yes Historical Provider, MD     Social History     Tobacco Use    Smoking status: Never Smoker    Smokeless tobacco: Never Used Substance Use Topics    Alcohol use: No    Drug use: No     Family History   Problem Relation Age of Onset    Heart Failure Mother     Cancer Mother         lung    Thyroid Disease Mother     Heart Failure Father     Diabetes Paternal Aunt     Diabetes Paternal Uncle      Past Surgical History:   Procedure Laterality Date    APPENDECTOMY  1976    same time as cholecystectomy    CARDIOVERSION  09/17/2019    DCCV to NSR    CARDIOVERSION  10/29/2019    Successful CV to NSR   (Dr. Loida Josue)   [address]    COLONOSCOPY  11/27/2012    diarrhea, irritable bowel syndrome, anal polyp biopsied, Dr. Farrah Cervantes, [address] Left 6/13/2019    AV FISTULA CREATION  LEFT ARM performed by Letty Damon MD at [address] Left 8/9/2019    AV FISTULA REVISION -- LEFT ARM performed by Letty Damon MD at [address] Left 12/10/2020    AV FISTULA CREATION  LEFT ARM performed by Letty Damon MD at [address], COLON, DIAGNOSTIC      colonoscopy    EYE SURGERY  2014    cataract bilateral    [address]    same time as C section, ? left ovary    TUBAL LIGATION  1987    same time as C section     Past Medical History:   Diagnosis Date    A-fib ([facility] [address] 03/2014    pt states diagnosed incidently during tx for uri, Dr Ronnie Enriquez PCP treating, pt to see Dr Rudy Clay first vs 04/28/2014.     Anemia associated with chronic renal failure     Arthritis     Atrial fibrillation (HCC)     CHF (congestive heart failure) (HCC)     Chronic kidney disease, stage IV (severe) (HCC)     Diabetes mellitus (HCC)     Hyperlipidemia     Hypertension     FINE PER PT    IBS (irritable bowel syndrome)     Obesity     Osteoarthritis     knees, right hand and left shoulder    Stomach cancer (HCC)     Tendonitis of foot     left    Vitamin D deficiency     resolved vit supplement per pt Review of Systems    Numbness as describe all others negative         Objective:   BP (!) 161/80   Pulse 50   Temp 97.3 °F (36.3 °C) (Temporal)   Resp 18   Ht 5' 4\" (1.626 m)   Wt 260 lb (117.9 kg)   SpO2 96%   BMI 44.63 kg/m²     Physical Exam  Constitutional:       Appearance: She is obese. HENT:      Head: Normocephalic and atraumatic. Nose: Nose normal.      Mouth/Throat:      Mouth: Mucous membranes are moist.      Pharynx: Oropharynx is clear. Eyes:      General: Lids are normal.      Extraocular Movements: Extraocular movements intact. Conjunctiva/sclera: Conjunctivae normal.      Pupils: Pupils are equal, round, and reactive to light. Neck:      Musculoskeletal: Neck supple. Vascular: No carotid bruit. Cardiovascular:      Rate and Rhythm: Normal rate and regular rhythm. Pulmonary:      Breath sounds: Normal breath sounds. Abdominal:      Palpations: Abdomen is soft. Musculoskeletal: Normal range of motion. Comments: Bruising  around fistula site    Neurological:      Mental Status: She is alert. Deep Tendon Reflexes: Strength normal.      Reflex Scores:       Tricep reflexes are 1+ on the right side. Bicep reflexes are 1+ on the right side. Psychiatric:         Speech: Speech normal.         Behavior: Behavior normal.                 Neurological Exam  Mental Status  Alert. Recent and remote memory are intact. Speech is normal. Language is fluent with no aphasia. Cranial Nerves  CN II: Visual fields full to confrontation. CN III, IV, VI: Extraocular movements intact bilaterally. Normal lids and orbits bilaterally. Pupils equal round and reactive to light bilaterally. CN V: Facial sensation is normal.  CN VII: Full and symmetric facial movement. CN VIII: Hearing is normal.  CN IX, X: Normal gag reflex. CN XI: Shoulder shrug strength is normal.  CN XII: Tongue midline without atrophy or fasciculations.     Motor Normal muscle bulk throughout. Normal muscle tone. No abnormal involuntary movements. Strength is 5/5 throughout all four extremities. Sensory  Light touch abnormality: Pinprick abnormality: Right hemisensory loss. Reflexes                                           Right                      Left  Biceps                                1+                          Triceps                               1+                          Patellar                                Tr                         Tr  Achilles                                Tr                         Tr      Laboratory/Radiology:     CBC with Differential:    Lab Results   Component Value Date    WBC 5.1 12/16/2020    RBC 3.75 12/16/2020    HGB 10.6 12/16/2020    HCT 35.0 12/16/2020     12/16/2020    MCV 93.3 12/16/2020    MCH 28.3 12/16/2020    MCHC 30.3 12/16/2020    RDW 15.7 12/16/2020    SEGSPCT 62 11/21/2012    LYMPHOPCT 20.8 12/15/2020    MONOPCT 8.0 12/15/2020    BASOPCT 0.5 12/15/2020    MONOSABS 0.33 12/15/2020    LYMPHSABS 0.86 12/15/2020    EOSABS 0.18 12/15/2020    BASOSABS 0.02 12/15/2020     CMP:    Lab Results   Component Value Date     12/16/2020    K 4.7 12/16/2020    K 4.5 12/10/2020     12/16/2020    CO2 22 12/16/2020    BUN 40 12/16/2020    CREATININE 2.1 12/16/2020    GFRAA 29 12/16/2020    LABGLOM 24 12/16/2020    GLUCOSE 86 12/16/2020    GLUCOSE 223 04/16/2012    PROT 7.4 01/31/2020    LABALBU 4.0 01/31/2020    LABALBU 3.9 04/16/2012    CALCIUM 8.8 12/16/2020    BILITOT 0.8 01/31/2020    ALKPHOS 104 01/31/2020    AST 18 01/31/2020    ALT 12 01/31/2020     Warfarin PT/INR:  No components found for: PTPATWAR, PTINRWAR  Troponin:    Lab Results   Component Value Date    TROPONINI <0.01 12/15/2020     HgBA1c:    Lab Results   Component Value Date    LABA1C 5.6 01/31/2020       CT head:      I independently reviewed the labs and imaging studies at today's appointment.      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 Plan:     Follow up MRI brain. Continue anticoagulation. May need add aspirin if confirms small vessel etiology. Check a1c and Lipids   Risk factor modification.     Marybeth Read  10:54 AM  12/16/2020

## 2020-12-16 NOTE — PROGRESS NOTES
Patient complaining of left arm pain. No pain medication ordered.  Sent perfect serve to Dr. Yvan Barger for pain medication

## 2020-12-16 NOTE — PROGRESS NOTES
Pt was supine in bed upon room entry, agreeable to PT evaluation. Pt ambulates with normal gait speed with good steadiness. Pt negotiated stairs with nonreciprocal step pattern and fair steadiness with rails. Pt reports she is at her baseline and is agreeable that there are no skilled PT needs at this time. Pt was left seated at EOB with all needs met at conclusion of session. PLAN:    Based on pt's current level of functional independence for all mobility, this pt is not a candidate for continued skilled PT services. Will remove pt from PT caseload. Please re-consult if pt experiences functional decline. Thank you. Time in: 1530  Time out: 1540    Total Treatment Time 0 minutes     Evaluation Time includes thorough review of current medical information, gathering information on past medical history/social history and prior level of function, completion of standardized testing/informal observation of tasks, assessment of data and education on plan of care and goals.     CPT codes:  [x] Low Complexity PT evaluation 04865  [] Moderate Complexity PT evaluation 11789  [] High Complexity PT evaluation 89471  [] PT Re-evaluation 02305  [] Gait training 38495 0 minutes  [] Manual therapy 50760 0 minutes  [] Therapeutic activities 35000 0 minutes  [] Therapeutic exercises 14249 0 minutes  [] Neuromuscular reeducation 22442 0 minutes     Ebenezer He PT, DPT  [id number]

## 2020-12-17 NOTE — PROGRESS NOTES
Discharge instructions and follow up appointments explained pt verbalized understanding. Heart monitor removed and returned.

## 2020-12-17 NOTE — PROGRESS NOTES
OCCUPATIONAL THERAPY INITIAL EVALUATION      Date:2020  Patient Name: Dora Mahajan  MRN: [id number]  : 1955  Room: [facility]      [address], OTR/L [id number]    AM-PAC Daily Activity Raw Score:   Recommended Adaptive Equipment: none    Diagnosis: Paresthesia and pain of right extremity [M79.609, R20.2]  Acute ischemic stroke [facility]) [I63.9]     Referring physician: Pati Han MD  Pertinent Medical History: A-Fib, anemia, arthritis, A-Fib, CHF, CKD, DM, HTN, obesity, OA, stomach CA, tendonitis of foot    Home Living: Pt lives with son and grandson in 2 floor home. Ramped entry   Full bath on 1st floor, bed on 2nd floor - flight of stairs, handrails  Bathroom setup: tub/shower (1st floor)  Equipment owned: n/a    Prior Level of Function: independent with ADLs , shares IADLs; ambulated independently w/o AD  Driving: no  Enjoys crocheting    Pain Level: Pt c/o 5/10 L arm this session ; RN medicated prior to session. Cognition: A&O: 4/4; Follows 1-2 step directions  Pleasant and cooperative   Memory:  good  (good recall)   Sequencing:  good    Problem solving:  good    Judgement/safety:  good      Functional Assessment:   Initial Eval Status  Date: 20 Treatment Status  Date: Short Term Goals/LTG  Treatment frequency: Evaluation Only   Feeding Independent      Grooming Independent      UB Dressing Independent      LB Dressing Modified [place]      Bathing Modified [place]     Toileting Modified [place]      Bed Mobility  Rolling: Independent   Supine to sit:  Independent   Sit to supine: Independent      Functional Transfers Independent  Various surfaces     Functional Mobility Independent w/o AD  In room and bathroom     Balance Sitting: Independent  Standing: Independent, no AD     Activity Tolerance Good     Visual/  Perceptual Glasses: yes   WFL             Hand dominance: R   Strength ROM Additional Info:    SALLY    [facility] good  and wfl FMC/dexterity noted during ADL tasks       LUE 5/5  WFL   good  and wfl FMC/dexterity noted during ADL tasks       Hearing: WFL   Sensation: c/o tingling R UE and R facial>R LE  Light touch: mildly diminished R UE  Tone: WFL  Edema: L UE - bruising also noted                   Comments: Upon arrival patient lying in bed. Pt agreeable to OT session this date. At end of session, patient lying in bed (per pt request) with call light and phone within reach, all lines and tubes intact. Evaluation only - pt independent/mod I w/ self-care tasks and functional mobility. Pt demonstrated good safety awareness during all functional tasks. Pt c/o mild R UE/LE tingling however does not impede function. Additional OT not indicated at this time, no home going needs. Please re-consult if change. Treatment: OT treatment provided this date includes:  Facilitation of bed mobility, unsupported sitting balance, functional transfers (various surfaces), standing tolerance tasks (while incorporating light functional reaching) and functional ambulation tasks without AD (safety reinforced). Therapist facilitated self-care retraining: UB/LB self-care tasks, simulated toileting task and standing grooming tasks while educating pt on safety. Skilled monitoring of HR, O2 sats and pts response to treatment. Eval Complexity: Low    Evaluation Time includes thorough review of current medical information, gathering information on past medical history/social history and prior level of function, completion of standardized testing/informal observation of tasks, assessment of data and education on plan of care and goals.     Assessment of current deficits   Functional mobility []  ADLs [] Strength []  Cognition []  Functional transfers  [] IADLs [] Safety Awareness []  Endurance []  Fine Motor Coordination [] Balance [] Vision/perception [] Sensation [x] Gross Motor Coordination [] ROM [] Delirium []                  Motor Control []    Plan of Care: n/a    Patient and/or family were instructed on diagnosis and plan of care. Demonstrated good understanding. [] Malnutrition indicators have been identified and nursing has been notified to ensure a dietitian consult is ordered.        Low Evaluation completed              Time In: 10:47  Time Out: 11:05   Total Time:  18 minutes   Min Units   OT Eval Low 97165 X 1   OT Eval Medium 10981     OT Eval High [id number]     OT Re-Eval [id number]     Therapeutic Ex 48876     Therapeutic Activities 89140     ADL/Self Care 17326     Orthotic Management 58071     Neuro Re-Ed 2323 [address], OTR/L [id number]

## 2020-12-17 NOTE — DISCHARGE SUMMARY
Discharge Summary    Date: 12/17/2020  Patient Name: Clari Miller YOB: 1955 Age: 72 y.o. Admit Date: 12/15/2020  Discharge Date: 12/17/2020  Discharge Condition: Stable    Admission Diagnosis  Paresthesia and pain of right extremity (M79.609, R20.2); Acute ischemic stroke (HCC) (I63.9)     Discharge Diagnosis  Active Problems: Paresthesia and pain of right extremity Acute ischemic stroke (HCC)Resolved Problems: * No resolved hospital problems. [place] Stay  Narrative of Hospital Course:  Patient admitted with right arm numbness. MRI showed acute lacunar infract. Continue risk factor modification. Consultants:  IP CONSULT TO ANESTHESIOLOGYIP CONSULT TO INTERNAL MEDICINEIP CONSULT TO NEUROLOGY    Surgeries/procedures Performed:       Treatments:           Discharge Plan/Disposition:  Home    Hospital/Incidental Findings Requiring Follow Up:    Patient Instructions:    Diet: Diabetic Diet    Activity:Activity as Tolerated  For number of days (if applicable): Other Instructions:    Provider Follow-Up:   No follow-ups on file.      Significant Diagnostic Studies:    Recent Labs: Admission on 12/15/2020Ventricular Rate                             Date: 12/15/2020Value: 96          Ref range: BPM                Status: FinalAtrial Rate                                   Date: 12/15/2020Value: 125         Ref range: BPM                Status: FinalQRS Duration                                  Date: 12/15/2020Value: 148         Ref range: ms                 Status: FinalQ-T Interval                                  Date: 12/15/2020Value: 446         Ref range: ms                 Status: FinalQTc Calculation (Bazett)                      Date: 12/15/2020Value: 563         Ref range: ms                 Status: FinalR Axis                                        Date: 12/15/2020Value: 48          Ref range: degrees            Status: FinalT Axis                                        Date: 12/15/2020Value: 16          Ref range: degrees            Status: [address]                                           Date: 12/15/2020Value: 4.1*        Ref range: 4.5 - 11.5 E9/L    Status: FinalRBC                                           Date: 12/15/2020Value: 3.92        Ref range: 3.50 - 5.50 E12/L  Status: FinalHemoglobin                                    Date: 12/15/2020Value: 11.3*       Ref range: 11.5 - 15.5 g/dL   Status: FinalHematocrit                                    Date: 12/15/2020Value: 36.0        Ref range: 34.0 - 48.0 %      Status: FinalMCV                                           Date: 12/15/2020Value: 91.8        Ref range: 80.0 - 99.9 fL     Status: [address]                                           Date: 12/15/2020Value: 28.8        Ref range: 26.0 - 35.0 pg     Status: [address]                                          Date: 12/15/2020Value: 31.4*       Ref range: 32.0 - 34.5 %      Status: FinalRDW                                           Date: 12/15/2020Value: 15.8*       Ref range: 11.5 - 15.0 fL     Status: FinalPlatelets                                     Date: 12/15/2020Value: 133         Ref range: 130 - 450 E9/L     Status: FinalMPV                                           Date: 12/15/2020Value: 11.5        Ref range: 7.0 - 12.0 fL      Status: FinalNeutrophils %                                 Date: 12/15/2020Value: 66.2        Ref range: 43.0 - 80.0 %      Status: FinalImmature Granulocytes %                       Date: 12/15/2020Value: 0.2         Ref range: 0.0 - 5.0 %        Status: FinalLymphocytes %                                 Date: 12/15/2020Value: 20.8        Ref range: 20.0 - 42.0 %      Status: FinalMonocytes %                                   Date: 12/15/2020Value: 8.0         Ref range: 2.0 - 12.0 %       Status: FinalEosinophils %                                 Date: 12/15/2020Value: 4.3         Ref range: 0.0 - 6.0 %        Status: FinalBasophils %                                   Date: 12/15/2020Value: 0.5         Ref range: 0.0 - 2.0 %        Status: FinalNeutrophils Absolute                          Date: 12/15/2020Value: 2.74        Ref range: 1.80 - 7.30 E9/L   Status: FinalImmature Granulocytes #                       Date: 12/15/2020Value: 0.01        Ref range: E9/L               Status: FinalLymphocytes Absolute                          Date: 12/15/2020Value: 0.86*       Ref range: 1.50 - 4.00 E9/L   Status: FinalMonocytes Absolute                            Date: 12/15/2020Value: 0.33        Ref range: 0.10 - 0.95 E9/L   Status: FinalEosinophils Absolute                          Date: 12/15/2020Value: 0.18        Ref range: 0.05 - 0.50 E9/L   Status: FinalBasophils Absolute                            Date: 12/15/2020Value: 0.02        Ref range: 0.00 - 0.20 E9/L   Status: FinalSodium                                        Date: 12/15/2020Value: 141         Ref range: 132 - 146 mmol/L   Status: FinalPotassium                                     Date: 12/15/2020Value: 4.3         Ref range: 3.5 - 5.0 mmol/L   Status: FinalChloride Date: 12/15/2020Value: 107         Ref range: 98 - 107 mmol/L    Status: FinalCO2                                           Date: 12/15/2020Value: 25          Ref range: 22 - 29 mmol/L     Status: FinalAnion Gap                                     Date: 12/15/2020Value: 9           Ref range: 7 - 16 mmol/L      Status: FinalGlucose                                       Date: 12/15/2020Value: 97          Ref range: 74 - 99 mg/dL      Status: FinalBUN                                           Date: 12/15/2020Value: 47*         Ref range: 8 - 23 mg/dL       Status: FinalCREATININE                                    Date: 12/15/2020Value: 2.3*        Ref range: 0.5 - 1.0 mg/dL    Status: FinalGFR Non-                      Date: 12/15/2020Value: 21          Ref range: >=60 mL/min/1.73   Status: Final              Comment: Chronic Kidney Disease: less than 60 ml/min/1.73 sq.m. Kidney Failure: less than 15 ml/min/1.73 sq. m. Results valid for patients 18 years and older. GFR                           Date: 12/15/2020Value: 26            Status: FinalCalcium                                       Date: 12/15/2020Value: 8.7         Ref range: 8.6 - 10.2 mg/dL   Status: FinalMagnesium                                     Date: 12/15/2020Value: 2.2         Ref range: 1.6 - 2.6 mg/dL    Status: FinalProtime                                       Date: 12/15/2020Value: 15.1*       Ref range: 9.3 - 12.4 sec     Status: FinalINR                                           Date: 12/15/2020Value: 1.3           Status: FinalTroponin                                      Date: 12/15/2020Value: <0.01       Ref range: 0.00 - 0.03 ng/mL  Status: Final              Comment: TROPONIN T BLOOD LEVELS:       0.03 ng/mL     Upper Reference Limit0. 04 - 0.09 ng/mL     Possible myocardial injury    >= 0.10 ng/mL     Myocardial injuryMeter Glucose                                 Date: Date: 12/16/2020Value: 80          Ref range: 74 - 99 mg/dL      Status: FinalBUN                                           Date: 12/16/2020Value: 40*         Ref range: 8 - 23 mg/dL       Status: FinalCREATININE                                    Date: 12/16/2020Value: 2.1*        Ref range: 0.5 - 1.0 mg/dL    Status: FinalGFR Non-                      Date: 12/16/2020Value: 24          Ref range: >=60 mL/min/1.73   Status: Final              Comment: Chronic Kidney Disease: less than 60 ml/min/1.73 sq.m. Kidney Failure: less than 15 ml/min/1.73 sq. m. Results valid for patients 18 years and older. GFR                           Date: 12/16/2020Value: 29            Status: FinalCalcium                                       Date: 12/16/2020Value: 8.8         Ref range: 8.6 - 10.2 mg/dL   Status: FinalMeter Glucose                                 Date: 12/15/2020Value: 113*        Ref range: 74 - 99 mg/dL      Status: FinalCholesterol, Total                            Date: 12/16/2020Value: 108         Ref range: 0 - 199 mg/dL      Status: FinalTriglycerides                                 Date: 12/16/2020Value: 102         Ref range: 0 - 149 mg/dL      Status: FinalHDL                                           Date: 12/16/2020Value: 39          Ref range: >40 mg/dL          Status: FinalLDL Calculated                                Date: 12/16/2020Value: 49          Ref range: 0 - 99 mg/dL       Status: FinalVLDL Cholesterol Calculated                   Date: 12/16/2020Value: 20          Ref range: mg/dL              Status: FinalHemoglobin A1C                                Date: 12/16/2020Value: 7.0*        Ref range: 4.0 - 5.6 %        Status: FinalMeter Glucose                                 Date: 12/16/2020Value: 135*        Ref range: 74 - 99 mg/dL      Status: FinalMeter Glucose                                 Date: 12/16/2020Value: 114* Ref range: 74 - 99 mg/dL      Status: [address] [facility]                                           Date: 12/17/2020Value: 4.8         Ref range: 4.5 - 11.5 E9/L    Status: FinalRBC                                           Date: 12/17/2020Value: 3.80        Ref range: 3.50 - 5.50 E12/L  Status: FinalHemoglobin                                    Date: 12/17/2020Value: 10.9*       Ref range: 11.5 - 15.5 g/dL   Status: FinalHematocrit                                    Date: 12/17/2020Value: 34.8        Ref range: 34.0 - 48.0 %      Status: FinalMCV                                           Date: 12/17/2020Value: 91.6        Ref range: 80.0 - 99.9 fL     Status: [facility]                                           Date: 12/17/2020Value: 28.7        Ref range: 26.0 - 35.0 pg     Status: [address]                                          Date: 12/17/2020Value: 31.3*       Ref range: 32.0 - 34.5 %      Status: FinalRDW                                           Date: 12/17/2020Value: 15.9*       Ref range: 11.5 - 15.0 fL     Status: FinalPlatelets                                     Date: 12/17/2020Value: 151         Ref range: 130 - 450 E9/L     Status: FinalMPV                                           Date: 12/17/2020Value: 12.1*       Ref range: 7.0 - 12.0 fL      Status: FinalSodium                                        Date: 12/17/2020Value: 140         Ref range: 132 - 146 mmol/L   Status: FinalPotassium                                     Date: 12/17/2020Value: 4.6         Ref range: 3.5 - 5.0 mmol/L   Status: FinalChloride                                      Date: 12/17/2020Value: 108*        Ref range: 98 - 107 mmol/L    Status: FinalCO2                                           Date: 12/17/2020Value: 25          Ref range: 22 - 29 mmol/L     Status: FinalAnion Gap                                     Date: 12/17/2020Value: 7           Ref range: 7 - 16 mmol/L      Status: FinalGlucose Ct Head Wo ContrastResult Date: 12/15/2020No acute intracranial abnormality. Chronic lacunar infarcts as described above. Ct Cervical Spine Wo ContrastResult Date: 12/15/99836. There is no acute fracture or subluxation of the cervical spine 2. Minimal degenerative disc disease at the C5-6 level. Cta Neck W ContrastResult Date: 12/15/2020No acute abnormality or flow-limiting stenosis of the major arteries of the head and neck. Lung nodules measuring up to 2.4 mm. Follow-up recommendation is listed below. RECOMMENDATIONS: Fleischner Society guidelines for follow-up and management of incidentally detected pulmonary nodules: Multiple Solid Nodules: Nodule size less than 6 mm In a low-risk patient, no routine follow-up. In a high-risk patient, optional CT at 12 months. - Low risk patients include individuals with minimal or absent history of smoking and other known risk factors. - High risk patients include individuals with a history or smoking or known risk factors. Radiology 2017 http://pubs. rsna.org/doi/full/10.1148/radiol. 3080297126GFL Head W ContrastResult Date: 12/15/2020No acute abnormality or flow-limiting stenosis of the major arteries of the head and neck. Lung nodules measuring up to 2.4 mm. Follow-up recommendation is listed below. RECOMMENDATIONS: Fleischner Society guidelines for follow-up and management of incidentally detected pulmonary nodules: Multiple Solid Nodules: Nodule size less than 6 mm In a low-risk patient, no routine follow-up. In a high-risk patient, optional CT at 12 months. - Low risk patients include individuals with minimal or absent history of smoking and other known risk factors. - High risk patients include individuals with a history or smoking or known risk factors. Radiology 2017 http://pubs. rsna.org/doi/full/10.1148/radiol. 2997263596VOL Brain Wo ContrastResult Date: 12/16/20201. Small, 7 x 3 mm acute to early subacute lacunar infarction in the left thalamus.  2. No mass effect, hemorrhage or midline shift. [unfilled]    Discharge Medications    Current Discharge Medication List    Current Discharge Medication List    Current Discharge Medication ListCONTINUE these medications which have NOT CHANGEDapixaban (ELIQUIS) 5 MG TABS tabletTake 5 mg by mouth 2 times dailyloratadine (CLARITIN) 10 MG tabletTake 10 mg by mouth dailypravastatin (PRAVACHOL) 80 MG tabletTake 80 mg by mouth dailyfebuxostat (ULORIC) 40 MG TABS tabletTake 40 mg by mouth dailylosartan (COZAAR) 25 MG tabletTake 25 mg by mouth every evening insulin glargine (LANTUS) 100 UNIT/ML injection vialInject 35 Units into the skin nightly magnesium oxide (MAG-OX) 400 MG tabletTake 400 mg by mouth dailyhydrALAZINE (APRESOLINE) 25 MG tabletTake 25 mg by mouth 3 times daily furosemide (LASIX) 40 MG tabletTake 40 mg by mouth daily isosorbide mononitrate (IMDUR) 60 MG CR tabletTake 60 mg by mouth daily sodium bicarbonate 650 MG tabletTake 1,300 mg by mouth 2 times daily    Current Discharge Medication ListSTOP taking these medicationsoxyCODONE-acetaminophen (PERCOCET) 5-325 MG per tabletComments:Reason for Stopping:albuterol sulfate HFA (VENTOLIN HFA) 108 (90 Base) MCG/ACT inhalerComments:Reason for Stopping:CPAP Machine MISCComments:Reason for Stopping:vitamin B-12 (CYANOCOBALAMIN) 1000 MCG tabletComments:Reason for Stopping:INSULIN SYRINGE .5CC/29G (ACCUSURE INS SYR .5CC/29GX1/2\") 29G X 1/2\" 0.5 ML MISCComments:Reason for Stopping:Coenzyme Q-10 100 MG CAPSComments:Reason for Stopping:Glucose Blood (BLOOD GLUCOSE TEST STRIPS) STRPComments:Reason for Stopping:Lancets MISCComments:Reason for Stopping:    Time Spent on Discharge:1E] minutes were spent in patient examination, evaluation, counseling as well as medication reconciliation, prescriptions for required medications, discharge plan, and follow up.     Electronically signed by Phil Almeida MD on 12/17/20 at 11:11 AM EST

## 2020-12-17 NOTE — PROGRESS NOTES
Progress Note  Date:2020       Room:8502/8502-A  Patient Dm Mensah     YOB: 1955     Age:65 y.o. Patient says her right arm is less numb today. Subjective    Subjective:  Symptoms:  Stable. No shortness of breath, chest pain or weakness. Diet:  Adequate intake. Activity level: Normal.    Pain:  She reports no pain. Review of Systems   Constitutional: Positive for activity change. Negative for fever. HENT: Negative for congestion. Respiratory: Negative for shortness of breath. Cardiovascular: Negative for chest pain. Gastrointestinal: Negative for abdominal pain. Genitourinary: Negative for difficulty urinating. Musculoskeletal: Negative for arthralgias. Neurological: Positive for numbness. Negative for weakness. Hematological: Negative for adenopathy. Psychiatric/Behavioral: Negative for agitation. Objective         Vitals Last 24 Hours:  TEMPERATURE:  Temp  Av.7 °F (36.5 °C)  Min: 97.3 °F (36.3 °C)  Max: 98 °F (36.7 °C)  RESPIRATIONS RANGE: Resp  Av  Min: 18  Max: 18  PULSE OXIMETRY RANGE: SpO2  Av.3 %  Min: 96 %  Max: 98 %  PULSE RANGE: Pulse  Av.7  Min: 50  Max: 70  BLOOD PRESSURE RANGE: Systolic (27GII), FUL:098 , Min:137 , WFM:581   ; Diastolic (43INC), WEO:94, Min:56, Max:99    I/O (24Hr): Intake/Output Summary (Last 24 hours) at 2020 0653  Last data filed at 2020 1200  Gross per 24 hour   Intake 120 ml   Output    Net 120 ml     Objective:  General Appearance:  Comfortable. Vital signs: (most recent): Blood pressure (!) 137/56, pulse 70, temperature 98 °F (36.7 °C), temperature source Temporal, resp. rate 18, height 5' 4\" (1.626 m), weight 260 lb (117.9 kg), SpO2 98 %, not currently breastfeeding. No fever. Lungs:  Normal effort and normal respiratory rate. Breath sounds clear to auscultation. Heart: Normal rate. Regular rhythm.   S1 normal and S2 normal. Abdomen: Abdomen is soft. There is no abdominal tenderness. Labs/Imaging/Diagnostics    Labs:  CBC:  Recent Labs     12/15/20  0933 12/16/20  0509   WBC 4.1* 5.1   RBC 3.92 3.75   HGB 11.3* 10.6*   HCT 36.0 35.0   MCV 91.8 93.3   RDW 15.8* 15.7*    138     CHEMISTRIES:  Recent Labs     12/15/20  0933 12/16/20  0509    142   K 4.3 4.7    111*   CO2 25 22   BUN 47* 40*   CREATININE 2.3* 2.1*   GLUCOSE 97 86   MG 2.2  --      PT/INR:  Recent Labs     12/15/20  0933   PROTIME 15.1*   INR 1.3     APTT:No results for input(s): APTT in the last 72 hours. LIVER PROFILE:No results for input(s): AST, ALT, BILIDIR, BILITOT, ALKPHOS in the last 72 hours.     Imaging Last 24 Hours:  Ct Head Wo Contrast    Result Date: 12/15/2020 EXAMINATION: CT OF THE HEAD WITHOUT CONTRAST  12/15/2020 9:11 am TECHNIQUE: CT of the head was performed without the administration of intravenous contrast. Dose modulation, iterative reconstruction, and/or weight based adjustment of the mA/kV was utilized to reduce the radiation dose to as low as reasonably achievable. COMPARISON: None. HISTORY: ORDERING SYSTEM PROVIDED HISTORY: right sided tingling TECHNOLOGIST PROVIDED HISTORY: Has a \"code stroke\" or \"stroke alert\" been called? ->No Reason for exam:->right sided tingling What reading provider will be dictating this exam?->CRC FINDINGS: BRAIN/VENTRICLES: There is no acute intracranial hemorrhage, mass effect or midline shift. No abnormal extra-axial fluid collection. The gray-white differentiation is maintained without evidence of an acute infarct. There is no evidence of hydrocephalus. Chronic appearing left thalamic lacunar infarct. Chronic appearing lacunar infarct in the left basal ganglia. ORBITS: The visualized portion of the orbits demonstrate no acute abnormality. SINUSES: The visualized paranasal sinuses and mastoid air cells demonstrate no acute abnormality. SOFT TISSUES/SKULL:  No acute abnormality of the visualized skull or soft tissues. No acute intracranial abnormality. Chronic lacunar infarcts as described above.     Ct Cervical Spine Wo Contrast    Result Date: 12/15/2020 EXAMINATION: CT OF THE CERVICAL SPINE WITHOUT CONTRAST 12/15/2020 10:11 am TECHNIQUE: CT of the cervical spine was performed without the administration of intravenous contrast. Multiplanar reformatted images are provided for review. Dose modulation, iterative reconstruction, and/or weight based adjustment of the mA/kV was utilized to reduce the radiation dose to as low as reasonably achievable. COMPARISON: None. HISTORY: ORDERING SYSTEM PROVIDED HISTORY: right sided tingling, r/o degenerative disc disease TECHNOLOGIST PROVIDED HISTORY: Reason for exam:->right sided tingling, r/o degenerative disc disease What reading provider will be dictating this exam?->CRC FINDINGS: BONES/ALIGNMENT: The ring of C1 is intact as is the dense. There is no compression fracture of the cervical spine. No jumped or perched facet is noted. There is minimal degenerative disc disease at the C5-6 level. The prevertebral soft tissues are unremarkable. There is no soft tissue mass. 1. There is no acute fracture or subluxation of the cervical spine 2. Minimal degenerative disc disease at the C5-6 level.     Cta Neck W Contrast    Result Date: 12/15/2020 EXAMINATION: CTA OF THE HEAD WITH CONTRAST; CTA OF THE NECK 12/15/2020 10:11 am: TECHNIQUE: CTA of the head/brain was performed with the administration of intravenous contrast. Multiplanar reformatted images are provided for review. MIP images are provided for review. Dose modulation, iterative reconstruction, and/or weight based adjustment of the mA/kV was utilized to reduce the radiation dose to as low as reasonably achievable.; CTA of the neck was performed with the administration of intravenous contrast. Multiplanar reformatted images are provided for review. MIP images are provided for review. Stenosis of the internal carotid arteries measured using NASCET criteria. Dose modulation, iterative reconstruction, and/or weight based adjustment of the mA/kV was utilized to reduce the radiation dose to as low as reasonably achievable. COMPARISON: Noncontrast CT head from earlier today. HISTORY: ORDERING SYSTEM PROVIDED HISTORY: CVA TECHNOLOGIST PROVIDED HISTORY: Has a \"code stroke\" or \"stroke alert\" been called? ->Yes Reason for exam:->CVA What reading provider will be dictating this exam?->CRC FINDINGS: CTA NECK: AORTIC ARCH/ARCH VESSELS: No dissection or arterial injury. No significant stenosis of the brachiocephalic or subclavian arteries. CAROTID ARTERIES: No dissection, arterial injury, or hemodynamically significant stenosis by NASCET criteria. VERTEBRAL ARTERIES: No dissection, arterial injury, or significant stenosis. SOFT TISSUES: The lung apices are clear. There are calcified mediastinal and bilateral hilar lymph nodes, likely related to prior granulomatous disease. There is 2 mm lung nodule in the left upper lobe (series 6, image 45). There is 2.4 mm lung nodule in the right upper lobe (series 6, image 12). There is ground-glass attenuation at the lung apices, likely related to underdistention. There are nonenlarged mediastinal lymph nodes, likely reactive. No evidence of cervical lymphadenopathy. The larynx and pharynx are unremarkable. No acute abnormality of the salivary and thyroid glands. BONES: No acute osseous abnormality. CTA HEAD: ANTERIOR CIRCULATION: No significant stenosis of the intracranial internal carotid, anterior cerebral, or middle cerebral arteries. No aneurysm. POSTERIOR CIRCULATION: No significant stenosis of the vertebral, basilar, or posterior cerebral arteries. No aneurysm. OTHER: No dural venous sinus thrombosis on this non-dedicated study. BRAIN: No mass effect or midline shift. No extra-axial fluid collection. There are old lacunar infarcts in the bilateral thalami and left basal ganglia. No evidence of acute territorial infarction. No acute abnormality or flow-limiting stenosis of the major arteries of the head and neck. Lung nodules measuring up to 2.4 mm. Follow-up recommendation is listed below. RECOMMENDATIONS: Fleischner Society guidelines for follow-up and management of incidentally detected pulmonary nodules: Multiple Solid Nodules: Nodule size less than 6 mm In a low-risk patient, no routine follow-up. In a high-risk patient, optional CT at 12 months. - Low risk patients include individuals with minimal or absent history of smoking and other known risk factors. - High risk patients include individuals with a history or smoking or known risk factors. Radiology 2017 http://pubs. rsna.org/doi/full/10.1148/radiol. 2637410327    Cta Head W Contrast    Result Date: 12/15/2020 The larynx and pharynx are unremarkable. No acute abnormality of the salivary and thyroid glands. BONES: No acute osseous abnormality. CTA HEAD: ANTERIOR CIRCULATION: No significant stenosis of the intracranial internal carotid, anterior cerebral, or middle cerebral arteries. No aneurysm. POSTERIOR CIRCULATION: No significant stenosis of the vertebral, basilar, or posterior cerebral arteries. No aneurysm. OTHER: No dural venous sinus thrombosis on this non-dedicated study. BRAIN: No mass effect or midline shift. No extra-axial fluid collection. There are old lacunar infarcts in the bilateral thalami and left basal ganglia. No evidence of acute territorial infarction. No acute abnormality or flow-limiting stenosis of the major arteries of the head and neck. Lung nodules measuring up to 2.4 mm. Follow-up recommendation is listed below. RECOMMENDATIONS: Fleischner Society guidelines for follow-up and management of incidentally detected pulmonary nodules: Multiple Solid Nodules: Nodule size less than 6 mm In a low-risk patient, no routine follow-up. In a high-risk patient, optional CT at 12 months. - Low risk patients include individuals with minimal or absent history of smoking and other known risk factors. - High risk patients include individuals with a history or smoking or known risk factors. Radiology 2017 http://pubs. rsna.org/doi/full/10.1148/radiol. 5653506068    Mri Brain Wo Contrast    Result Date: 12/16/2020 EXAMINATION: MRI OF THE BRAIN WITHOUT CONTRAST  12/16/2020 2:19 pm TECHNIQUE: Multiplanar multisequence MRI of the brain was performed without the administration of intravenous contrast. COMPARISON: CT head without contrast, 12/15/2020 HISTORY: ORDERING SYSTEM PROVIDED HISTORY: stroke TECHNOLOGIST PROVIDED HISTORY: Reason for exam:->stroke What reading provider will be dictating this exam?->CRC FINDINGS: INTRACRANIAL STRUCTURES/VENTRICLES: There is a small, 7 x 3 mm focus of acute to early subacute infarction in the left thalamus. No hemorrhage, mass effect or midline shift is seen. Mild volume loss is seen in the brain with mild to moderate chronic microvascular ischemic changes. No hydrocephalus or extra-axial fluid is seen. No mass effect or midline shift. No evidence of an acute intracranial hemorrhage. The ventricles and sulci are normal in size and configuration. The sellar/suprasellar regions appear unremarkable. The normal signal voids within the major intracranial vessels appear maintained. ORBITS: Prosthetic lenses are seen in the globes bilaterally. The orbits are otherwise grossly unremarkable. SINUSES: The visualized paranasal sinuses and mastoid air cells are well aerated. BONES/SOFT TISSUES: The bone marrow signal intensity appears normal. The soft tissues demonstrate no acute abnormality. 1. Small, 7 x 3 mm acute to early subacute lacunar infarction in the left thalamus. 2. No mass effect, hemorrhage or midline shift. Assessment//Plan           Hospital Problems           Last Modified POA    Paresthesia and pain of right extremity 12/15/2020 Yes    Acute ischemic stroke ([address] 12/16/2020 Yes        Assessment:  (Acute lunar infarct  Paresthesia and pain of right extremity    Afib  Hyperlipidemia  HTN  DM  ). Plan:   (Symptoms improved. Continue meds. Neurology following. ).

## 2020-12-17 NOTE — PROGRESS NOTES
Soraya Johnson is a 72 y.o.  female     Neurology is following for stroke. Past medical history significant for hypertension, hyperlipidemia, A. fib (on Eliquis), diabetes, arthritis, CHF, CKD, stomach cancer    She presented with sudden onset sensory symptoms of the right side of the body. CT of the head showed a small area of hypodensity in the left thalamus. MRI of the brain confirms a subacute stroke in this area. Vessel imaging did not show any significant stenosis. A1c 7.0, LDL 49. Paresthesias on the R side are slowing improving-- not yet back to baseline. No new neuro sx. No chest pain or palpitations  No SOB  No vertigo, lightheadedness or loss of consciousness  No falls, tripping or stumbling  No incontinence of bowels or bladder  No itching or bruising appreciated  (+) numbness, tingling R face, arm   No focal arm/leg weakness    ROS otherwise negative     Objective:       BP (!) 145/50   Pulse 54   Temp 98.2 °F (36.8 °C) (Temporal)   Resp 18   Ht 5' 4\" (1.626 m)   Wt 260 lb (117.9 kg)   SpO2 97%   BMI 44.63 kg/m²        General appearance: alert, appears stated age and cooperative- morbidly obese   Head: Normocephalic, without obvious abnormality, atraumatic  Eyes: conjunctivae/corneas clear. Neck: Supple. Trachea midline.    Lungs: clear to auscultation bilaterally  Heart: regular rate and rhythm, S1, S2 normal, no murmur, click, rub or gallop  Extremities: extremities normal, atraumatic, no cyanosis or edema  Pulses: 2+ and symmetric  Skin: Skin color, texture, turgor normal. No rashes or lesions       Mental Status: Alert, oriented, thought content appropriate     Appropriate attention/concentration  Intact fundus of knowledge  Repetition intact  Intact memories    Speech: No dysarthria   Language: No aphasia     Cranial Nerves:  I: smell    II: visual acuity     II: visual fields Full to confrontation   II: pupils SABRINA   III,VII: ptosis None No acute abnormality or flow-limiting stenosis of the major arteries of the  head and neck. MRI brain   1. Small, 7 x 3 mm acute to early subacute lacunar infarction in the left  thalamus. 2. No mass effect, hemorrhage or midline shift.     I personally reviewed all labs and images today   Assessment:     Early subacute lacunar infarct in the L thalamus producing a pure sensory lacunar stroke   Etiology- small vessel   Add ASA for secondary prevention     Plan:     Continue Eliquis and statin     Start ASA 81 mg     Risk factor control     Stroke education     Stroke clinic follow up     Okay for d/c from neuro POV     Neuro will sign off, please call with questions       Ernesto Matthews PA-C  9:24 AM  12/17/2020

## 2020-12-17 NOTE — CARE COORDINATION
Therapy evals noted. Patient is independent, plan remains home at discharge with family. No needs noted.

## 2021-01-01 ENCOUNTER — VIRTUAL VISIT (OUTPATIENT)
Dept: NEUROLOGY | Age: 66
End: 2021-01-01
Payer: MEDICARE

## 2021-01-01 ENCOUNTER — TELEPHONE (OUTPATIENT)
Dept: VASCULAR SURGERY | Age: 66
End: 2021-01-01

## 2021-01-01 ENCOUNTER — APPOINTMENT (OUTPATIENT)
Dept: CT IMAGING | Age: 66
End: 2021-01-01
Payer: MEDICARE

## 2021-01-01 ENCOUNTER — APPOINTMENT (OUTPATIENT)
Dept: GENERAL RADIOLOGY | Age: 66
End: 2021-01-01
Payer: MEDICARE

## 2021-01-01 ENCOUNTER — OFFICE VISIT (OUTPATIENT)
Dept: VASCULAR SURGERY | Age: 66
End: 2021-01-01

## 2021-01-01 ENCOUNTER — HOSPITAL ENCOUNTER (EMERGENCY)
Age: 66
Discharge: ANOTHER HOSPITAL | End: 2021-05-07
Attending: EMERGENCY MEDICINE
Payer: MEDICARE

## 2021-01-01 VITALS
BODY MASS INDEX: 44.39 KG/M2 | DIASTOLIC BLOOD PRESSURE: 82 MMHG | HEIGHT: 64 IN | SYSTOLIC BLOOD PRESSURE: 124 MMHG | WEIGHT: 260 LBS

## 2021-01-01 VITALS
SYSTOLIC BLOOD PRESSURE: 119 MMHG | BODY MASS INDEX: 44.39 KG/M2 | DIASTOLIC BLOOD PRESSURE: 78 MMHG | RESPIRATION RATE: 16 BRPM | WEIGHT: 260 LBS | OXYGEN SATURATION: 100 % | TEMPERATURE: 98.1 F | HEART RATE: 67 BPM | HEIGHT: 64 IN

## 2021-01-01 DIAGNOSIS — E78.5 DYSLIPIDEMIA, GOAL LDL BELOW 70: ICD-10-CM

## 2021-01-01 DIAGNOSIS — R20.2 ARM PARESTHESIA, RIGHT: ICD-10-CM

## 2021-01-01 DIAGNOSIS — E87.20 LACTIC ACIDOSIS: ICD-10-CM

## 2021-01-01 DIAGNOSIS — I46.9 CARDIAC ARREST (HCC): Primary | ICD-10-CM

## 2021-01-01 DIAGNOSIS — Z99.2 ENCOUNTER REGARDING VASCULAR ACCESS FOR DIALYSIS FOR ESRD (HCC): Primary | ICD-10-CM

## 2021-01-01 DIAGNOSIS — I10 ESSENTIAL HYPERTENSION: ICD-10-CM

## 2021-01-01 DIAGNOSIS — A41.9 SEPTICEMIA (HCC): ICD-10-CM

## 2021-01-01 DIAGNOSIS — Z95.1 HISTORY OF CORONARY ARTERY BYPASS GRAFT: ICD-10-CM

## 2021-01-01 DIAGNOSIS — J69.0 ASPIRATION PNEUMONIA, UNSPECIFIED ASPIRATION PNEUMONIA TYPE, UNSPECIFIED LATERALITY, UNSPECIFIED PART OF LUNG (HCC): ICD-10-CM

## 2021-01-01 DIAGNOSIS — N18.6 ENCOUNTER REGARDING VASCULAR ACCESS FOR DIALYSIS FOR ESRD (HCC): Primary | ICD-10-CM

## 2021-01-01 DIAGNOSIS — I63.81 CEREBROVASCULAR ACCIDENT (CVA) DUE TO OCCLUSION OF SMALL ARTERY (HCC): Primary | ICD-10-CM

## 2021-01-01 DIAGNOSIS — Z79.4 TYPE 2 DIABETES MELLITUS WITHOUT COMPLICATION, WITH LONG-TERM CURRENT USE OF INSULIN (HCC): ICD-10-CM

## 2021-01-01 DIAGNOSIS — E11.9 TYPE 2 DIABETES MELLITUS WITHOUT COMPLICATION, WITH LONG-TERM CURRENT USE OF INSULIN (HCC): ICD-10-CM

## 2021-01-01 LAB
AADO2: 505.4 MMHG
ABO/RH: NORMAL
ACINETOBACTER BAUMANNII BY PCR: NOT DETECTED
ALBUMIN SERPL-MCNC: 2.8 G/DL (ref 3.5–5.2)
ALP BLD-CCNC: 156 U/L (ref 35–104)
ALT SERPL-CCNC: 42 U/L (ref 0–32)
ANION GAP SERPL CALCULATED.3IONS-SCNC: 16 MMOL/L (ref 7–16)
ANISOCYTOSIS: ABNORMAL
ANTIBODY SCREEN: NORMAL
APTT: 23.4 SEC (ref 24.5–35.1)
AST SERPL-CCNC: 92 U/L (ref 0–31)
B.E.: -6.7 MMOL/L (ref -3–3)
BACTERIA: ABNORMAL /HPF
BASOPHILS ABSOLUTE: 0.1 E9/L (ref 0–0.2)
BASOPHILS RELATIVE PERCENT: 0.9 % (ref 0–2)
BILIRUB SERPL-MCNC: 0.8 MG/DL (ref 0–1.2)
BILIRUBIN URINE: NEGATIVE
BLOOD, URINE: ABNORMAL
BOTTLE TYPE: ABNORMAL
BUN BLDV-MCNC: 101 MG/DL (ref 6–23)
CALCIUM SERPL-MCNC: 8.9 MG/DL (ref 8.6–10.2)
CANDIDA ALBICANS BY PCR: NOT DETECTED
CANDIDA GLABRATA BY PCR: NOT DETECTED
CANDIDA KRUSEI BY PCR: NOT DETECTED
CANDIDA PARAPSILOSIS BY PCR: NOT DETECTED
CANDIDA TROPICALIS BY PCR: NOT DETECTED
CHLORIDE BLD-SCNC: 106 MMOL/L (ref 98–107)
CLARITY: ABNORMAL
CO2: 25 MMOL/L (ref 22–29)
COHB: 1.5 % (ref 0–1.5)
COLOR: YELLOW
CREAT SERPL-MCNC: 2.4 MG/DL (ref 0.5–1)
CRITICAL: ABNORMAL
D DIMER: 4233 NG/ML DDU
DATE ANALYZED: ABNORMAL
DATE OF COLLECTION: ABNORMAL
EKG ATRIAL RATE: 375 BPM
EKG Q-T INTERVAL: 396 MS
EKG QRS DURATION: 128 MS
EKG QTC CALCULATION (BAZETT): 495 MS
EKG R AXIS: 91 DEGREES
EKG T AXIS: 36 DEGREES
EKG VENTRICULAR RATE: 94 BPM
ENTEROBACTER CLOACAE COMPLEX BY PCR: NOT DETECTED
ENTEROBACTERALES BY PCR: NOT DETECTED
ENTEROCOCCUS BY PCR: NOT DETECTED
EOSINOPHILS ABSOLUTE: 0.1 E9/L (ref 0.05–0.5)
EOSINOPHILS RELATIVE PERCENT: 0.9 % (ref 0–6)
ESCHERICHIA COLI BY PCR: NOT DETECTED
FIO2: 100 %
GFR AFRICAN AMERICAN: 20
GFR AFRICAN AMERICAN: 24
GFR NON-AFRICAN AMERICAN: 17 ML/MIN/1.73
GFR NON-AFRICAN AMERICAN: 20 ML/MIN/1.73
GLUCOSE BLD-MCNC: 165 MG/DL (ref 74–99)
GLUCOSE BLD-MCNC: 169 MG/DL (ref 74–99)
GLUCOSE URINE: NEGATIVE MG/DL
HAEMOPHILUS INFLUENZAE BY PCR: NOT DETECTED
HCO3: 24.3 MMOL/L (ref 22–26)
HCT VFR BLD CALC: 43.8 % (ref 34–48)
HEMOGLOBIN: 11.8 G/DL (ref 11.5–15.5)
HHB: 3.4 % (ref 0–5)
INR BLD: 1.8
KETONES, URINE: NEGATIVE MG/DL
KLEBSIELLA OXYTOCA BY PCR: NOT DETECTED
KLEBSIELLA PNEUMONIAE GROUP BY PCR: NOT DETECTED
LAB: ABNORMAL
LACTIC ACID: 7.5 MMOL/L (ref 0.5–2.2)
LEUKOCYTE ESTERASE, URINE: ABNORMAL
LISTERIA MONOCYTOGENES BY PCR: NOT DETECTED
LYMPHOCYTES ABSOLUTE: 1.94 E9/L (ref 1.5–4)
LYMPHOCYTES RELATIVE PERCENT: 18.3 % (ref 20–42)
Lab: ABNORMAL
MAGNESIUM: 3.4 MG/DL (ref 1.6–2.6)
MCH RBC QN AUTO: 27.8 PG (ref 26–35)
MCHC RBC AUTO-ENTMCNC: 26.9 % (ref 32–34.5)
MCV RBC AUTO: 103.3 FL (ref 80–99.9)
METAMYELOCYTES RELATIVE PERCENT: 0.9 % (ref 0–1)
METHB: 0.2 % (ref 0–1.5)
METHICILLIN RESISTANCE MECA/C  BY PCR: NOT DETECTED
MODE: AC
MONOCYTES ABSOLUTE: 0.54 E9/L (ref 0.1–0.95)
MONOCYTES RELATIVE PERCENT: 5.2 % (ref 2–12)
NEISSERIA MENINGITIDIS BY PCR: NOT DETECTED
NEUTROPHILS ABSOLUTE: 8.1 E9/L (ref 1.8–7.3)
NEUTROPHILS RELATIVE PERCENT: 73.9 % (ref 43–80)
NITRITE, URINE: NEGATIVE
NUCLEATED RED BLOOD CELLS: 1.7 /100 WBC
O2 CONTENT: 16.7 ML/DL
O2 SATURATION: 96.5 % (ref 92–98.5)
O2HB: 94.9 % (ref 94–97)
OPERATOR ID: 2863
ORDER NUMBER: ABNORMAL
ORGANISM: ABNORMAL
ORGANISM: ABNORMAL
PATIENT TEMP: 37 C
PCO2: 80.4 MMHG (ref 35–45)
PDW BLD-RTO: 17.5 FL (ref 11.5–15)
PEEP/CPAP: 8 CMH2O
PERFORMED ON: ABNORMAL
PFO2: 1.02 MMHG/%
PH BLOOD GAS: 7.1 (ref 7.35–7.45)
PH UA: 5 (ref 5–9)
PLATELET # BLD: 364 E9/L (ref 130–450)
PMV BLD AUTO: 10.6 FL (ref 7–12)
PO2: 102.2 MMHG (ref 75–100)
POC CHLORIDE: 109 MMOL/L (ref 100–108)
POC CREATININE: 2.8 MG/DL (ref 0.5–1)
POC POTASSIUM: 5.3 MMOL/L (ref 3.5–5)
POC SODIUM: 145 MMOL/L (ref 132–146)
POLYCHROMASIA: ABNORMAL
POTASSIUM SERPL-SCNC: 4.69 MMOL/L (ref 3.5–5)
POTASSIUM SERPL-SCNC: 5.2 MMOL/L (ref 3.5–5)
PRO-BNP: ABNORMAL PG/ML (ref 0–125)
PROTEIN UA: 30 MG/DL
PROTEUS SPECIES BY PCR: NOT DETECTED
PROTHROMBIN TIME: 19.5 SEC (ref 9.3–12.4)
PSEUDOMONAS AERUGINOSA BY PCR: NOT DETECTED
RBC # BLD: 4.24 E12/L (ref 3.5–5.5)
RBC UA: ABNORMAL /HPF (ref 0–2)
RI(T): 495 %
RR MECHANICAL: 18 B/MIN
SARS-COV-2, NAAT: NOT DETECTED
SERRATIA MARCESCENS BY PCR: NOT DETECTED
SODIUM BLD-SCNC: 147 MMOL/L (ref 132–146)
SOURCE OF BLOOD CULTURE: ABNORMAL
SOURCE, BLOOD GAS: ABNORMAL
SPECIFIC GRAVITY UA: 1.02 (ref 1–1.03)
STAPHYLOCOCCUS AUREUS BY PCR: NOT DETECTED
STAPHYLOCOCCUS SPECIES BY PCR: DETECTED
STREPTOCOCCUS AGALACTIAE BY PCR: NOT DETECTED
STREPTOCOCCUS PNEUMONIAE BY PCR: NOT DETECTED
STREPTOCOCCUS PYOGENES  BY PCR: NOT DETECTED
STREPTOCOCCUS SPECIES BY PCR: NOT DETECTED
THB: 12.4 G/DL (ref 11.5–16.5)
TIME ANALYZED: 1311
TOTAL PROTEIN: 6.2 G/DL (ref 6.4–8.3)
TROPONIN: 0.06 NG/ML (ref 0–0.03)
URINE CULTURE, ROUTINE: NORMAL
UROBILINOGEN, URINE: 0.2 E.U./DL
VT MECHANICAL: 500 ML
WBC # BLD: 10.8 E9/L (ref 4.5–11.5)
WBC UA: ABNORMAL /HPF (ref 0–5)

## 2021-01-01 PROCEDURE — 71275 CT ANGIOGRAPHY CHEST: CPT

## 2021-01-01 PROCEDURE — 6360000002 HC RX W HCPCS: Performed by: STUDENT IN AN ORGANIZED HEALTH CARE EDUCATION/TRAINING PROGRAM

## 2021-01-01 PROCEDURE — 82947 ASSAY GLUCOSE BLOOD QUANT: CPT

## 2021-01-01 PROCEDURE — 86900 BLOOD TYPING SEROLOGIC ABO: CPT

## 2021-01-01 PROCEDURE — 84132 ASSAY OF SERUM POTASSIUM: CPT

## 2021-01-01 PROCEDURE — 86850 RBC ANTIBODY SCREEN: CPT

## 2021-01-01 PROCEDURE — 83735 ASSAY OF MAGNESIUM: CPT

## 2021-01-01 PROCEDURE — 82435 ASSAY OF BLOOD CHLORIDE: CPT

## 2021-01-01 PROCEDURE — 93010 ELECTROCARDIOGRAM REPORT: CPT | Performed by: INTERNAL MEDICINE

## 2021-01-01 PROCEDURE — 80053 COMPREHEN METABOLIC PANEL: CPT

## 2021-01-01 PROCEDURE — 83605 ASSAY OF LACTIC ACID: CPT

## 2021-01-01 PROCEDURE — 36415 COLL VENOUS BLD VENIPUNCTURE: CPT

## 2021-01-01 PROCEDURE — 85378 FIBRIN DEGRADE SEMIQUANT: CPT

## 2021-01-01 PROCEDURE — 84295 ASSAY OF SERUM SODIUM: CPT

## 2021-01-01 PROCEDURE — 74174 CTA ABD&PLVS W/CONTRAST: CPT

## 2021-01-01 PROCEDURE — 87077 CULTURE AEROBIC IDENTIFY: CPT

## 2021-01-01 PROCEDURE — 81001 URINALYSIS AUTO W/SCOPE: CPT

## 2021-01-01 PROCEDURE — 70450 CT HEAD/BRAIN W/O DYE: CPT

## 2021-01-01 PROCEDURE — 85610 PROTHROMBIN TIME: CPT

## 2021-01-01 PROCEDURE — 99024 POSTOP FOLLOW-UP VISIT: CPT | Performed by: NURSE PRACTITIONER

## 2021-01-01 PROCEDURE — 82805 BLOOD GASES W/O2 SATURATION: CPT

## 2021-01-01 PROCEDURE — 86901 BLOOD TYPING SEROLOGIC RH(D): CPT

## 2021-01-01 PROCEDURE — 2580000003 HC RX 258: Performed by: RADIOLOGY

## 2021-01-01 PROCEDURE — 85025 COMPLETE CBC W/AUTO DIFF WBC: CPT

## 2021-01-01 PROCEDURE — 99442 PR PHYS/QHP TELEPHONE EVALUATION 11-20 MIN: CPT | Performed by: NURSE PRACTITIONER

## 2021-01-01 PROCEDURE — 92950 HEART/LUNG RESUSCITATION CPR: CPT

## 2021-01-01 PROCEDURE — 83880 ASSAY OF NATRIURETIC PEPTIDE: CPT

## 2021-01-01 PROCEDURE — 6360000004 HC RX CONTRAST MEDICATION: Performed by: RADIOLOGY

## 2021-01-01 PROCEDURE — 31500 INSERT EMERGENCY AIRWAY: CPT

## 2021-01-01 PROCEDURE — 84484 ASSAY OF TROPONIN QUANT: CPT

## 2021-01-01 PROCEDURE — 36556 INSERT NON-TUNNEL CV CATH: CPT

## 2021-01-01 PROCEDURE — 2500000003 HC RX 250 WO HCPCS

## 2021-01-01 PROCEDURE — 87040 BLOOD CULTURE FOR BACTERIA: CPT

## 2021-01-01 PROCEDURE — 93005 ELECTROCARDIOGRAM TRACING: CPT | Performed by: EMERGENCY MEDICINE

## 2021-01-01 PROCEDURE — 2580000003 HC RX 258: Performed by: EMERGENCY MEDICINE

## 2021-01-01 PROCEDURE — 2580000003 HC RX 258: Performed by: STUDENT IN AN ORGANIZED HEALTH CARE EDUCATION/TRAINING PROGRAM

## 2021-01-01 PROCEDURE — 85730 THROMBOPLASTIN TIME PARTIAL: CPT

## 2021-01-01 PROCEDURE — 2500000003 HC RX 250 WO HCPCS: Performed by: STUDENT IN AN ORGANIZED HEALTH CARE EDUCATION/TRAINING PROGRAM

## 2021-01-01 PROCEDURE — 71045 X-RAY EXAM CHEST 1 VIEW: CPT

## 2021-01-01 PROCEDURE — 99285 EMERGENCY DEPT VISIT HI MDM: CPT

## 2021-01-01 PROCEDURE — 82565 ASSAY OF CREATININE: CPT

## 2021-01-01 PROCEDURE — 87150 DNA/RNA AMPLIFIED PROBE: CPT

## 2021-01-01 PROCEDURE — 87088 URINE BACTERIA CULTURE: CPT

## 2021-01-01 PROCEDURE — 87186 SC STD MICRODIL/AGAR DIL: CPT

## 2021-01-01 PROCEDURE — 87635 SARS-COV-2 COVID-19 AMP PRB: CPT

## 2021-01-01 RX ORDER — SODIUM CHLORIDE 9 MG/ML
INJECTION, SOLUTION INTRAVENOUS CONTINUOUS
Status: DISCONTINUED | OUTPATIENT
Start: 2021-01-01 | End: 2021-01-01 | Stop reason: HOSPADM

## 2021-01-01 RX ORDER — MULTIVIT-MIN/IRON/FOLIC ACID/K 18-600-40
1 CAPSULE ORAL 2 TIMES DAILY
COMMUNITY

## 2021-01-01 RX ORDER — ASPIRIN 81 MG/1
81 TABLET ORAL DAILY
COMMUNITY

## 2021-01-01 RX ORDER — CHOLECALCIFEROL (VITAMIN D3) 125 MCG
CAPSULE ORAL DAILY
COMMUNITY

## 2021-01-01 RX ORDER — CLINDAMYCIN PHOSPHATE 600 MG/50ML
600 INJECTION INTRAVENOUS ONCE
Status: COMPLETED | OUTPATIENT
Start: 2021-01-01 | End: 2021-01-01

## 2021-01-01 RX ORDER — ALBUTEROL SULFATE 90 UG/1
2 AEROSOL, METERED RESPIRATORY (INHALATION) EVERY 6 HOURS PRN
COMMUNITY

## 2021-01-01 RX ORDER — SODIUM CHLORIDE 0.9 % (FLUSH) 0.9 %
10 SYRINGE (ML) INJECTION PRN
Status: DISCONTINUED | OUTPATIENT
Start: 2021-01-01 | End: 2021-01-01 | Stop reason: HOSPADM

## 2021-01-01 RX ORDER — 0.9 % SODIUM CHLORIDE 0.9 %
2000 INTRAVENOUS SOLUTION INTRAVENOUS ONCE
Status: COMPLETED | OUTPATIENT
Start: 2021-01-01 | End: 2021-01-01

## 2021-01-01 RX ORDER — VITAMIN B COMPLEX
1 TABLET ORAL DAILY
COMMUNITY

## 2021-01-01 RX ORDER — ALLOPURINOL 100 MG/1
100 TABLET ORAL DAILY
COMMUNITY

## 2021-01-01 RX ADMIN — SODIUM CHLORIDE 2000 ML: 9 INJECTION, SOLUTION INTRAVENOUS at 13:41

## 2021-01-01 RX ADMIN — CLINDAMYCIN PHOSPHATE 600 MG: 600 INJECTION, SOLUTION INTRAVENOUS at 14:29

## 2021-01-01 RX ADMIN — VANCOMYCIN HYDROCHLORIDE 2250 MG: 10 INJECTION, POWDER, LYOPHILIZED, FOR SOLUTION INTRAVENOUS at 14:47

## 2021-01-01 RX ADMIN — Medication 5 MCG/MIN: at 13:35

## 2021-01-01 RX ADMIN — Medication 10 ML: at 14:28

## 2021-01-01 RX ADMIN — SODIUM CHLORIDE: 9 INJECTION, SOLUTION INTRAVENOUS at 13:18

## 2021-01-01 RX ADMIN — IOPAMIDOL 90 ML: 755 INJECTION, SOLUTION INTRAVENOUS at 14:28

## 2021-01-06 NOTE — PROGRESS NOTES
1/6/2021    Fabiana Rodgers  1955    Chief Complaint   Patient presents with    Post-Op Check     left arm fistula       Patient returns for post operative evaluation status post creation of a left brachiocephalic AVF. The patient denies any unexpected problems since the procedure except that she developed paraesthesias of the right arm and ear a day after her procedure. She was evaluated and diagnosed with lacunar infarct in the L thalamus. Since that time her symptoms have improved. She only has residual numbness of small portions of her right upper and lower arm. She denies any motor deficits. She denies any left hand pain. She is not yet on dialysis. Procedure Laterality Date    APPENDECTOMY  1976    same time as cholecystectomy    CARDIOVERSION  09/17/2019    DCCV to NSR    CARDIOVERSION  10/29/2019    Successful CV to NSR   (Dr. Daniella Scott)   [address]    COLONOSCOPY  11/27/2012    diarrhea, irritable bowel syndrome, anal polyp biopsied, Dr. Jake Hull, [address] Left 6/13/2019    AV FISTULA CREATION  LEFT ARM performed by Jayant Molina MD at [address] Left 8/9/2019    AV FISTULA REVISION -- LEFT ARM performed by Jayant Molina MD at [address] Left 12/10/2020    AV FISTULA CREATION  LEFT ARM performed by Jayant Molina MD at [address], COLON, DIAGNOSTIC      colonoscopy    EYE SURGERY  2014    cataract bilateral    [address]    same time as C section, ? left ovary    TUBAL LIGATION  1987    same time as C section       Physical Exam:  The incision(s) are healing without evidence of infection. Heart rhythm is regular. AVF with excellent thrill, but runs deep mid upper arm. Ultrasound at bedside measures fistula greater than 0.6 cm throughout and depth > 1 cm in mid upper arm.            Right      Left   Brachial     Radial  2   Femoral Popliteal     Dorsalis Pedis     Posterior Tibial     (3=normal, 2=diminished, 1=barely palpable, 4=widened)    Assessment:  Post-operative AV access. Problem List Items Addressed This Visit     Encounter regarding vascular access for dialysis for ESRD ([facility] [address] - Primary        I reviewed with the patient that her access is patent. At this point she will likely require revision, superficialization. However since she is not on dialysis yet, will give more time to see if fistula will mature more superficially. I reviewed with the patient that normal activities can be resumed as tolerated. I will plan to see her back in 2 months for re-evaluation. Pt seen and plan reviewed with Dr. Cindy Delaney. Abdi Bright CNP    Plan: Return in 2 month(s) for follow-up office visit.

## 2021-01-15 NOTE — PROGRESS NOTES
Sheng Polanco was read the following message We want to confirm that, for purposes of billing, this is a virtual visit with your provider for which we will submit a claim for reimbursement with your insurance company. You will be responsible for any copays, coinsurance amounts or other amounts not covered by your insurance company. If you do not accept this, unfortunately we will not be able to schedule or proceed with a virtual visit with the provider. Do you accept? Tone Santiago responded Yes .

## 2021-01-15 NOTE — PROGRESS NOTES
Latisha Mackey is a 72 y.o. female evaluated via telephone on 1/15/2021. Consent:  She and/or health care decision maker is aware that that she may receive a bill for this telephone service, depending on her insurance coverage, and has provided verbal consent to proceed: Yes    Documentation:  I communicated with the patient and/or health care decision maker about recent hospitalization for acute ischemic stroke. She has significant past medical history of hypertension, hyperlipidemia, diabetes mellitus, chronic kidney disease, arthritis, vitamin D deficiency, IBS, CHF, obesity, anemia and atrial fibrillation maintained on Eliquis. Details of this discussion including any medical advice provided: Review of their recent hospitalization including presenting symptoms, diagnostic tests and lab results, and stroke risk factor modification. She presented to the hospital on 12/15/2020 with sudden onset of right sided sensory changes- she reports the symptoms starting in her right ear progressing down to her right arm. Of note, she recently underwent surgery on 12/10/2020 for creation of left brachiocephalic AV fistula by vascular surgery. At which time she reports undergoing nerve block of her left arm with residual left thumb and wrist tingling. The following day she noted tingling to her right ear and arm and was advised to go to the hospital for further evaluation. CT head showed hypodensity in left basal ganglia and left thalamus, both appeared chronic. She underwent MRI brain which confirmed subacute left thalamic infarct with moderate burden of periventricular white matter changes. CTA head/neck revealed no significant atherosclerotic disease. She was discharged home on 12/17/2020 on Eliquis, ASA and statin therapy for secondary stroke prevention. Today, she continues to note tingling in her right arm as well as residual tingling in her left thumb and left wrist. She feels she has returned to her baseline functional status without difficulty. She remains on aspirin and Eliquis without reported complications. She denies personal or family history of stroke. She monitors her blood pressures and blood glucoses at home. This morning her blood pressure was 151/68 and glucose of 104. Assessment:  Acute left thalamic infarct producing right hemibody sensory changes. Etiology small vessel disease in setting of multiple vascular risk factors: hyperlipidemia, hypertension, diabetes mellitus and obesity. Additional stroke risk factors include atrial fibrillation. Plan:  Continue Eliquis for secondary prevention in setting of atrial fibrillation  Continue aspirin for secondary prevention in setting of lacunar stroke and moderate burden of small vessel disease with multiple vascular risk factors     Risk factor modification  Dyslipidemia, goal LDL less than 70: recent LDL 49, continue statin therapy  Diabetes mellitus, goal A1c less than 7%: A1c 7%, defer management to PCP  Hypertension: 151/68, continue home blood pressure monitoring  Obesity: BMI 44.63    Follow up with PCP as planned  Follow up with neurology as needed     I affirm this is a Patient Initiated Episode with an Established Patient who has not had a related appointment within my department in the past 7 days or scheduled within the next 24 hours.     Total Time: minutes: 11-20 minutes    Note: not billable if this call serves to triage the patient into an appointment for the relevant concern      Bijan Philip MSN, APRN, FNP-C

## 2021-03-02 NOTE — TELEPHONE ENCOUNTER
Patient called to cancel her appointment on 3-10-21 with Dr. Jia Ragland because she hasn't heard from her kidney doctor. She further states that she does not wish to proceed with a revision of her AVF. Advised patient to call her nephrologist and schedule appointment and to notify this office of the outcome of the appointment, will schedule follow up with Dr. Jia Ragland following nephrology appointment.

## 2021-05-07 NOTE — ED NOTES
Unable to place OG at this time, attempted by 2 nurses without success, Dr. Lili Reyes notified.       Nathan Lagos, RN  05/07/21 2559

## 2021-05-07 NOTE — ED PROVIDER NOTES
Department of Emergency Medicine   ED  Provider Note  Admit Date/RoomTime: 5/7/2021  1:00 PM  ED Room: CESAR/CESAR          5/7/21  1:20 PM EDT      HISTORY OF PRESENT ILLNESS:  (Nurses Notes Reviewed)    Chief Complaint:   Cardiac Arrest (per EMS patient arrested at nursing home in front of aide, cpr started immediately, 4 epi given by ems with return of pulses, approx. total downtime 25 minutes per EMS)      Source of history provided by:  EMS personnel. History/Exam Limitations: due to condition. Janett Colon is a 72 y.o. old female presenting to the emergency department by ambulance where the patient received oxygen, IV and see Ambulance Run Sheet prior to arrival., for cardiac arrest, which occured several minute(s) prior to arrival.   She has a history of Past MI. Code Status on file: Prior. The patient is a 59-year-old female with a history of atrial fibrillation anticoagulated on Eliquis, recent CABG surgery done in [place] by Dr. Jewell Crabtree, CKD on dialysis who presents to the emergency department via EMS from her nursing facility for a cardiac arrest.  Patient's downtime was approximately 27 minutes. Apparently the arrest was witnessed by a nursing aide at the facility. The initial rhythm was PEA and patient was given 4 rounds of epinephrine and CPR was also initiated. They did achieve ROSC prior to arrival.  Patient sternotomy scar had broken open during the CPR. History review of systems is otherwise limited due to the patient's severity of illness and critical condition. Duration:  Down time from arrest until ambulance arrival minutes. Total Time from arrest until hospital arrival minutes. Onset:  sudden. Witnessed: yes. Available History:      Prior Cardiac Disease:   Yes. Drug Use/Overdose ? No.     Drowning ? No.     GI Bleeding ? Maybe. Hypothermia ? No.     Other:   N/A. Prehospital Care:  [facility] Airway, IO, Epi, CPR. Initial Rhythm: PEA.   Prehospital Treatment:       CPR:   yes. Intubation:   Med Airway     IV Established: IO     Defibrillation:   no     External Pacer:   no     Epinephrine:   yes     Atropine:   no     Response to Treatment:  Transient return of pulse: Yes. Sustained return of pulse:  Yes. Associated Signs and Symptoms (preceding arrest):  unknown. Other History:   Afib on Eliquis, Anemia, CKD, CHF, CKD, DM, HTN, HLD, CVA. PAST MEDICAL, SURGICAL, SOCIAL AND FAMILY HISTORY SECTION    Past Medical History:  has a past medical history of A-fib ([facility] [address] Anemia associated with chronic renal failure, Arthritis, Atrial fibrillation ([address] CHF (congestive heart failure) ([address] Chronic kidney disease, stage IV (severe) ([address] Diabetes mellitus ([facility] [address] Hyperlipidemia, Hypertension, IBS (irritable bowel syndrome), Obesity, Osteoarthritis, Stomach cancer ([facility] [address] Stroke ([facility] [address] Tendonitis of foot, and Vitamin D deficiency. Past Surgical History:  has a past surgical history that includes Cholecystectomy ();  section (); Appendectomy (); Ovary removal (); Tubal ligation (); Colonoscopy (2012); Endoscopy, colon, diagnostic; eye surgery (); Dialysis fistula creation (Left, 2019); Dialysis fistula creation (Left, 2019); Cardioversion (2019); Cardioversion (10/29/2019); and Dialysis fistula creation (Left, 12/10/2020). Social History:  reports that she has never smoked. She has never used smokeless tobacco. She reports that she does not drink alcohol or use drugs. Family History: family history includes Cancer in her mother; Diabetes in her paternal aunt and paternal uncle; Heart Failure in her father and mother; Thyroid Disease in her mother. The patients home medications have been reviewed.     Allergies: Aldactone [spironolactone], Pcn [penicillins], Adhesive tape, Naproxen, and Terazosin hcl    Review of Systems:   Pertinent positives and negatives are stated within HPI, all other systems reviewed and are negative. PHYSICAL EXAM:   General: Unresponsive, Pale, extremities are cool. Head: Atraumatic. Eyes: 2 mm bilaterally, fixed, not reactive. ENT: Airway patent. [place] Airway in place   Cardiovascular: Heart sounds are irregular. Pulses are Normal.  Midline sternotomy scar has opened. Respiratory: No spontaneous respirations. Equal breath sounds with controlled ventilation. Abdominal: Not distended. Musculoskeletal: No deformities. Fistula in the LUE with palpable bruit/thrill. Skin: Pallor and cool skin. Neurological: Unresponsive.  Neurological exam limited due to clinical condition.       ------------------------------------------------ RESULTS ---------------------------------------------------    LABS  Results for orders placed or performed during the hospital encounter of 05/07/21   Culture, Blood 1    Specimen: Blood   Result Value Ref Range    Blood Culture, Routine (A)      Gram stain performed from blood culture bottle media  Gram positive cocci in clusters     Culture, Blood 2    Specimen: Blood   Result Value Ref Range    Culture, Blood 2 (A)      Gram stain performed from blood culture bottle media  Gram positive cocci in clusters     COVID-19, Rapid    Specimen: Nasopharyngeal Swab   Result Value Ref Range    SARS-CoV-2, NAAT Not Detected Not Detected   CBC Auto Differential   Result Value Ref Range    WBC 10.8 4.5 - 11.5 E9/L    RBC 4.24 3.50 - 5.50 E12/L    Hemoglobin 11.8 11.5 - 15.5 g/dL    Hematocrit 43.8 34.0 - 48.0 %    .3 (H) 80.0 - 99.9 fL    MCH 27.8 26.0 - 35.0 pg    MCHC 26.9 (L) 32.0 - 34.5 %    RDW 17.5 (H) 11.5 - 15.0 fL    Platelets 497 293 - 374 E9/L    MPV 10.6 7.0 - 12.0 fL    Neutrophils % 73.9 43.0 - 80.0 %    Lymphocytes % 18.3 (L) 20.0 - 42.0 %    Monocytes % 5.2 2.0 - 12.0 %    Eosinophils % 0.9 0.0 - 6.0 %    Basophils % 0.9 0.0 - 2.0 %    Neutrophils Absolute 8.10 (H) 1.80 - 7.30 E9/L    Lymphocytes Absolute 1.94 1.50 - 4.00 E9/L    Monocytes Absolute 0.54 0.10 - 0.95 E9/L    Eosinophils Absolute 0.10 0.05 - 0.50 E9/L    Basophils Absolute 0.10 0.00 - 0.20 E9/L    Metamyelocytes Relative 0.9 0.0 - 1.0 %    nRBC 1.7 /100 WBC    Anisocytosis 1+     Polychromasia 1+    Comprehensive Metabolic Panel   Result Value Ref Range    Sodium 147 (H) 132 - 146 mmol/L    Potassium 5.2 (H) 3.5 - 5.0 mmol/L    Chloride 106 98 - 107 mmol/L    CO2 25 22 - 29 mmol/L    Anion Gap 16 7 - 16 mmol/L    Glucose 169 (H) 74 - 99 mg/dL     (H) 6 - 23 mg/dL    CREATININE 2.4 (H) 0.5 - 1.0 mg/dL    GFR Non-African American 20 >=60 mL/min/1.73    GFR African American 24     Calcium 8.9 8.6 - 10.2 mg/dL    Total Protein 6.2 (L) 6.4 - 8.3 g/dL    Albumin 2.8 (L) 3.5 - 5.2 g/dL    Total Bilirubin 0.8 0.0 - 1.2 mg/dL    Alkaline Phosphatase 156 (H) 35 - 104 U/L    ALT 42 (H) 0 - 32 U/L    AST 92 (H) 0 - 31 U/L   D-Dimer, Quantitative   Result Value Ref Range    D-Dimer, Quant 4233 ng/mL DDU   Brain Natriuretic Peptide   Result Value Ref Range    Pro-BNP 33,187 (H) 0 - 125 pg/mL   APTT   Result Value Ref Range    aPTT 23.4 (L) 24.5 - 35.1 sec   Protime-INR   Result Value Ref Range    Protime 19.5 (H) 9.3 - 12.4 sec    INR 1.8    Lactic Acid, Plasma   Result Value Ref Range    Lactic Acid 7.5 (HH) 0.5 - 2.2 mmol/L   Blood Gas, Arterial   Result Value Ref Range    Date Analyzed 20210507     Time Analyzed 1311     Source: Blood Arterial     pH, Blood Gas 7.099 (LL) 7.350 - 7.450    PCO2 80.4 (HH) 35.0 - 45.0 mmHg    PO2 102.2 (H) 75.0 - 100.0 mmHg    HCO3 24.3 22.0 - 26.0 mmol/L    B.E. -6.7 (L) -3.0 - 3.0 mmol/L    O2 Sat 96.5 92.0 - 98.5 %    PO2/FIO2 1.02 mmHg/%    AaDO2 505.4 mmHg    RI(T) 495 %    O2Hb 94.9 94.0 - 97.0 %    COHb 1.5 0.0 - 1.5 %    MetHb 0.2 0.0 - 1.5 %    O2 Content 16.7 mL/dL    HHb 3.4 0.0 - 5.0 %    tHb (est) 12.4 11.5 - 16.5 g/dL    Potassium 4.69 3.50 - 5.00 mmol/L    Mode AC     FIO2 100.0 % Rr Mechanical 18.0 b/min    Vt Mechanical 500.0 mL    Peep/Cpap 8.0 cmH2O    Date Of Collection      Time Collected      Pt Temp 37.0 C     ID [id number]     Lab [id number]     Critical(s) Notified Handed report to Dr/RN    Magnesium   Result Value Ref Range    Magnesium 3.4 (H) 1.6 - 2.6 mg/dL   Urinalysis with Microscopic   Result Value Ref Range    Color, UA Yellow Straw/Yellow    Clarity, UA TURBID (A) Clear    Glucose, Ur Negative Negative mg/dL    Bilirubin Urine Negative Negative    Ketones, Urine Negative Negative mg/dL    Specific Gravity, UA 1.020 1.005 - 1.030    Blood, Urine LARGE (A) Negative    pH, UA 5.0 5.0 - 9.0    Protein, UA 30 (A) Negative mg/dL    Urobilinogen, Urine 0.2 <2.0 E.U./dL    Nitrite, Urine Negative Negative    Leukocyte Esterase, Urine SMALL (A) Negative    WBC, UA 2-5 0 - 5 /HPF    RBC, UA 10-20 (A) 0 - 2 /HPF    Bacteria, UA NONE SEEN None Seen /HPF   Troponin   Result Value Ref Range    Troponin 0.06 (H) 0.00 - 0.03 ng/mL   Blood ID, Molecular   Result Value Ref Range    Bottle Type Aerobic     Source of Blood Culture No site given     Order Number [id number]     Enterobacter cloacae complex by PCR Not Detected Not Detected    Escherichia coli by PCR Not Detected Not Detected    Klebsiella oxytoca by PCR Not Detected Not Detected    Klebsiella pneumoniae group by PCR Not Detected Not Detected    Proteus species by PCR Not Detected Not Detected    Streptococcus agalactiae by PCR Not Detected Not Detected    Staphylococcus aureus by PCR Not Detected Not Detected    Serratia marcescens by PCR Not Detected Not Detected    Streptococcus pneumoniae by PCR Not Detected Not Detected    Streptococcus pyogenes  by PCR Not Detected Not Detected    Acinetobacter baumannii by PCR Not Detected Not Detected    Candida albicans by PCR Not Detected Not Detected    Candida glabrata by PCR Not Detected Not Detected    Candida krusei by PCR Not Detected Not Detected    Candida parapsilosis by PCR Not Detected Not Detected    Candida tropicalis by PCR Not Detected Not Detected     Enterobacteriaceae by PCR Not Detected Not Detected    Enterococcus by PCR Not Detected Not Detected    Haemophilus Influenzae by PCR Not Detected Not Detected    Listeria monocytogenes by PCR Not Detected Not Detected    Neisseria meningitidis by PCR Not Detected Not Detected    Pseudomonas aeruginosa by PCR Not Detected Not Detected    Staphylococcus species by PCR DETECTED (AA) Not Detected    Streptococcus species by PCR Not Detected Not Detected    Methicillin Resistance mecA/C  by PCR Not Detected Not Detected   POCT Venous   Result Value Ref Range    POC Sodium 145 132 - 146 mmol/L    POC Potassium 5.3 (H) 3.5 - 5.0 mmol/L    POC Chloride 109 (H) 100 - 108 mmol/L    POC Glucose 165 (H) 74 - 99 mg/dl    POC Creatinine 2.8 (H) 0.5 - 1.0 mg/dL    GFR Non-African American 17 >=60 mL/min/1.73    GFR  20     Performed on SEE BELOW    EKG 12 Lead   Result Value Ref Range    Ventricular Rate 94 BPM    Atrial Rate 375 BPM    QRS Duration 128 ms    Q-T Interval 396 ms    QTc Calculation (Bazett) 495 ms    R Axis 91 degrees    T Axis 36 degrees   TYPE AND SCREEN   Result Value Ref Range    ABO/Rh A POS     Antibody Screen NEG        RADIOLOGY  CTA PULMONARY W CONTRAST   Final Result   No evidence of pulmonary embolism. No evidence of thoracic aortic aneurysm or dissection. Cardiomegaly and large bilateral pleural effusion with compressive   atelectasis of the lungs. CT HEAD WO CONTRAST   Final Result   No acute intracranial abnormality. Left maxillary sinusitis. CTA ABDOMEN PELVIS W CONTRAST   Final Result   No evidence of abdominal iliac arterial aneurysm, intimal dissection or   surrounding fluid collection. XR CHEST PORTABLE   Final Result   1. Satisfactory position endotracheal tube.       2.  CHF pattern with vascular congestion, bilateral pulmonary edema, and   probable bilateral pleural effusions, larger on the left. Superimposed   pneumonia not excluded. XR ABDOMEN FOR NG/OG/NE TUBE PLACEMENT    (Results Pending)         EKG Interpretation  Interpreted by emergency department physician, Dr. Karmen Shrestha. Rhythm: atrial fibrillation - controlled  Rate: normal  Axis: normal  Conduction: right bundle branch block (complete)  ST Segments: nonspecific changes  T Waves: non specific changes    Clinical Impression: Atrial fibrillation with right bundle branch block, ST depressions in the anterior septal leads, artifact present at baseline, intervals within normal limits, QTC is 495  Comparison to Old EKG  Changes as compared to prior. ---------------------------- NURSING NOTES AND VITALS REVIEWED -------------------------   The nursing notes within the ED encounter and vital signs as below have been reviewed. /78   Pulse 67   Temp 98.1 °F (36.7 °C) (Core)   Resp 16   Ht 5' 4\" (1.626 m)   Wt 260 lb (117.9 kg)   SpO2 100%   BMI 44.63 kg/m²   Oxygen Saturation Interpretation: Improved after treatment      ------------------------------------------PROGRESS NOTES -------------------------------------------    ED COURSE MEDICATIONS:                Medications   0.9 % sodium chloride bolus (0 mLs Intravenous Stopped 5/7/21 1545)   iopamidol (ISOVUE-370) 76 % injection 90 mL (90 mLs Intravenous Given 5/7/21 1428)   vancomycin (VANCOCIN) 2,250 mg in dextrose 5 % 500 mL IVPB (0 mg Intravenous Stopped 5/7/21 3548)   clindamycin (CLEOCIN) 600 mg in dextrose 5 % 50 mL IVPB (0 mg Intravenous Stopped 5/7/21 1449)       Medical Decision Making/ED Course: The patient is a 68-year-old female presents to the emergency department status post cardiac arrest.  Pulses were reestablished prior to arrival and ROSC was achieved after multiple rounds of epinephrine.  airway was replaced with ET tube during intubation. Central line was also placed.   Patient was treated with IV fluids and started on Levophed. Cultures were obtained and patient was started empiric antibiotics including clindamycin and vancomycin. Patient will be transferred to [facility] where she recently had the CABG. She remained on Levophed in the ED. Consulted with Dr. Ulysses Carson, ICU at [facility] who accepted for transfer. Confirmed patient's code status is DNRCCA. This patient's ED course included: a personal history and physicial examination, multiple bedside re-evaluations, IV medications, cardiac monitoring, continuous pulse oximetry and complex medical decision making and emergency management    CONSULTATIONS:            Spoke with Dr. Juarez Gonzales. Results of consult: Admit at [facility] ICU. Spoke with Dr. Alan Florez (CV surgery). Discussed case. They will provide consultation. PROCEDURES:          Intubation Procedure Note    Indication: Respiratory failure    Consent: Unable to be obtained due to the emergent nature of this procedure. Medications Used: None    Procedure: The patient was placed in the appropriate position. Cricoid pressure was not required. Intubation was performed by direct laryngoscopy using a laryngoscope and an 8.0 cuffed endotracheal tube. The cuff was then inflated and the tube was secured appropriately at a distance of 22 cm to the dental ridge. Initial confirmation of placement included bilateral breath sounds, an end tidal CO2 detector, absence of sounds over the stomach, tube fogging, adequate chest rise, adequate pulse oximetry reading and improved skin color. A chest x-ray to verify correct placement of the tube showed appropriate tube position. The patient tolerated the procedure well. Complications: None      Central Line Placement Procedure Note    Indication: centrally administered medications    Consent: Unable to be obtained due to the emergent nature of this procedure. Procedure:  The patient was positioned appropriately and the skin over the right femoral vein was prepped with betadine and draped in a sterile fashion. Local anesthesia was not performed due to the emergent nature of this procedure. A large bore needle was used to identify the vein. A guide wire was then inserted into the vein through the needle. A triple lumen catheter was then inserted into the vessel over the guide wire using the Seldinger technique. All ports showed good, free flowing blood return and were flushed with saline solution. The catheter was then securely fastened to the skin with suture. Two sutures were placed into the kit included tube clamp, proximal eyelets and a suture end from each of the securing sutures was extended around the catheter and tied to the proximal eyelets as an added measure to prevent dislodgement. An antibiotic disk was placed and the site was then covered with a sterile dressing. A post procedure X-ray was not indicated. The patient tolerated the procedure well. Complications: None            CRITICAL CARE:  Please note that the withdrawal or failure to initiate urgent interventions for this patient would likely result in a life threatening deterioration or permanent disability. Accordingly this patient received 30 minutes of critical care time, excluding separately billable procedures. Counseling:   I have spoken with the famil regarding the patient's treatment/condition at this time. --------------------------------------- IMPRESSION & DISPOSITION --------------------------------     IMPRESSION:  1. Cardiac arrest ([facility]    2. Lactic acidosis    3. History of coronary artery bypass graft    4. Aspiration pneumonia, unspecified aspiration pneumonia type, unspecified laterality, unspecified part of lung ([facility]    5. Septicemia ([facility]            DISPOSITION:  Disposition: Transfer to [facility] .   Patient condition is critical.      Please note that the withdrawal or failure to initiate urgent interventions for this patient would likely result in a life threatening deterioration or permanent disability. Systems at risk for deterioration include: cardiac, respiratory    Accordingly this patient received 40 minutes of critical care time, excluding separately billable procedures.        Deepti Quick DO  Resident  05/08/21 [id number]

## 2021-05-07 NOTE — ED NOTES
Bed: 22  Expected date:   Expected time:   Means of arrival:   Comments:  Rm [id number] [facility], RN  05/07/21 [id number]

## 2021-05-07 NOTE — ED NOTES
Dr. Lili Reyes spoke with family about the pt's code status, family decided to make pt a DNR-CCA. Consent signed by the pt's daughter and Dr. Lili Reyes and placed in the pt's chart.       Hussein Winn RN  05/07/21 0192

## 2021-05-07 NOTE — ED NOTES
Pt's temp=95.2, put warm blankets on pt. Will continue to monitor.       Elena Garnica RN  05/07/21 9438

## 2021-05-08 NOTE — ED NOTES
Patient transferred to [facility].     2 out of 4 gram stain + cocci in clusters were reported from Micro.           Shivam Evans RN  05/08/21 1819

## (undated) DEVICE — CLIP INT SM TI EZ LD LIG SYS WECK HORZ

## (undated) DEVICE — SYRINGE MED 10ML LUERLOCK TIP W/O SFTY DISP

## (undated) DEVICE — STANDARD HYPODERMIC NEEDLE,POLYPROPYLENE HUB: Brand: MONOJECT

## (undated) DEVICE — CANNULA INJ L2.5IN BLNT TIP 3MM CLR BODY W/ 1 W VLV DLP

## (undated) DEVICE — Z INACTIVE USE 2641837 CLIP LIG M BLU TI HRT SHP WIRE HORZ 600 PER BX

## (undated) DEVICE — SYRINGE, LUER LOCK, 5ML: Brand: MEDLINE

## (undated) DEVICE — GOWN,SIRUS,FABRNF,XL,20/CS: Brand: MEDLINE

## (undated) DEVICE — DRAPE SURGICAL HAND PROX AURORA

## (undated) DEVICE — LABEL MED 4 IN SURG PANEL W/ PEN STRL

## (undated) DEVICE — GLOVE SURG SZ 75 L12IN FNGR THK94MIL TRNSLUC YEL LTX

## (undated) DEVICE — SET SURG INSTR MINI VASC

## (undated) DEVICE — SURGICAL PROCEDURE PACK VASC MAJ CUST

## (undated) DEVICE — DOUBLE BASIN SET: Brand: MEDLINE INDUSTRIES, INC.

## (undated) DEVICE — DILATOR ART

## (undated) DEVICE — LOOP VES W13MM THK09MM MINI RED SIL FLD REPELLENT

## (undated) DEVICE — Z DUP USE 2257490 ADHESIVE SKIN CLSRE 036ML TPCL 2CTL CNCRLTE HIGH VSCSTY DRMB

## (undated) DEVICE — TOWEL,OR,DSP,ST,WHITE,DLX,4/PK,20PK/CS: Brand: MEDLINE

## (undated) DEVICE — TOWEL,OR,DSP,ST,BLUE,STD,6/PK,12PK/CS: Brand: MEDLINE

## (undated) DEVICE — SKIN AFFIX SURG ADHESIVE 72/CS 0.55ML: Brand: MEDLINE

## (undated) DEVICE — TRAY,SKIN SCRUB,DRY,W/GAUZE: Brand: MEDLINE

## (undated) DEVICE — SCANLAN® VASCU-STATT® SINGLE-USE BULLDOG CLAMP - MIDI ANGLED 45° (WHITE), CLAMPING PRESSURE 25-30 G (2/STERILE PKG): Brand: SCANLAN® VASCU-STATT® SINGLE-USE BULLDOG CLAMP

## (undated) DEVICE — PATIENT RETURN ELECTRODE, SINGLE-USE, CONTACT QUALITY MONITORING, ADULT, WITH 9FT CORD, FOR PATIENTS WEIGING OVER 33LBS. (15KG): Brand: MEGADYNE

## (undated) DEVICE — TUBING, SUCTION, 3/16" X 12', STRAIGHT: Brand: MEDLINE

## (undated) DEVICE — GAUZE,SPONGE,4"X4",16PLY,XRAY,STRL,LF: Brand: MEDLINE

## (undated) DEVICE — SOLUTION IV 500ML 0.9% SOD CHL PH 5 INJ USP VIAFLX PLAS

## (undated) DEVICE — CLOTH SURG PREP PREOPERATIVE CHLORHEXIDINE GLUC 2% READYPREP

## (undated) DEVICE — NEEDLE HYPO 25GA L1.5IN BLU POLYPR HUB S STL REG BVL STR

## (undated) DEVICE — STOPCOCK IV HI PRSS 1050PSI NDLLSS INJ 3 W LUER SWVL NUT

## (undated) DEVICE — 34" SINGLE PATIENT USE HOVERMATT BREATHABLE: Brand: SINGLE PATIENT USE HOVERMATT

## (undated) DEVICE — SYRINGE 20ML LL S/C 50

## (undated) DEVICE — BLADE CLIPPER GEN PURP NS